# Patient Record
Sex: FEMALE | Race: WHITE | NOT HISPANIC OR LATINO | Employment: OTHER | URBAN - METROPOLITAN AREA
[De-identification: names, ages, dates, MRNs, and addresses within clinical notes are randomized per-mention and may not be internally consistent; named-entity substitution may affect disease eponyms.]

---

## 2017-01-09 ENCOUNTER — HOSPITAL ENCOUNTER (OUTPATIENT)
Dept: RADIOLOGY | Age: 67
Discharge: HOME | End: 2017-01-09
Payer: MEDICARE

## 2017-01-09 DIAGNOSIS — M79.674 PAIN OF TOE OF RIGHT FOOT: ICD-10-CM

## 2017-01-09 PROCEDURE — 73718 MRI LOWER EXTREMITY W/O DYE: CPT

## 2017-01-10 ENCOUNTER — GENERIC CONVERSION - ENCOUNTER (OUTPATIENT)
Dept: OTHER | Facility: OTHER | Age: 67
End: 2017-01-10

## 2017-04-25 ENCOUNTER — ALLSCRIPTS OFFICE VISIT (OUTPATIENT)
Dept: OTHER | Facility: OTHER | Age: 67
End: 2017-04-25

## 2017-04-25 DIAGNOSIS — I10 ESSENTIAL (PRIMARY) HYPERTENSION: ICD-10-CM

## 2017-04-25 DIAGNOSIS — K31.7 POLYP OF STOMACH AND DUODENUM: ICD-10-CM

## 2017-04-25 DIAGNOSIS — D50.9 IRON DEFICIENCY ANEMIA: ICD-10-CM

## 2017-04-25 LAB — S PYO AG THROAT QL: NEGATIVE

## 2017-04-26 ENCOUNTER — LAB (OUTPATIENT)
Dept: LAB | Facility: CLINIC | Age: 67
End: 2017-04-26
Payer: MEDICARE

## 2017-04-26 DIAGNOSIS — D50.9 IRON DEFICIENCY ANEMIA: ICD-10-CM

## 2017-04-26 DIAGNOSIS — I10 ESSENTIAL (PRIMARY) HYPERTENSION: ICD-10-CM

## 2017-04-26 DIAGNOSIS — K31.7 POLYP OF STOMACH AND DUODENUM: ICD-10-CM

## 2017-04-26 LAB
ALBUMIN SERPL BCP-MCNC: 3.7 G/DL (ref 3.5–5)
ALP SERPL-CCNC: 75 U/L (ref 46–116)
ALT SERPL W P-5'-P-CCNC: 26 U/L (ref 12–78)
ANION GAP SERPL CALCULATED.3IONS-SCNC: 5 MMOL/L (ref 4–13)
AST SERPL W P-5'-P-CCNC: 14 U/L (ref 5–45)
BASOPHILS # BLD AUTO: 0.04 THOUSANDS/ΜL (ref 0–0.1)
BASOPHILS NFR BLD AUTO: 0 % (ref 0–1)
BILIRUB SERPL-MCNC: 0.7 MG/DL (ref 0.2–1)
BUN SERPL-MCNC: 16 MG/DL (ref 5–25)
CALCIUM SERPL-MCNC: 8.7 MG/DL (ref 8.3–10.1)
CHLORIDE SERPL-SCNC: 107 MMOL/L (ref 100–108)
CO2 SERPL-SCNC: 28 MMOL/L (ref 21–32)
CREAT SERPL-MCNC: 0.7 MG/DL (ref 0.6–1.3)
EOSINOPHIL # BLD AUTO: 0.19 THOUSAND/ΜL (ref 0–0.61)
EOSINOPHIL NFR BLD AUTO: 2 % (ref 0–6)
ERYTHROCYTE [DISTWIDTH] IN BLOOD BY AUTOMATED COUNT: 16 % (ref 11.6–15.1)
GFR SERPL CREATININE-BSD FRML MDRD: >60 ML/MIN/1.73SQ M
GLUCOSE P FAST SERPL-MCNC: 104 MG/DL (ref 65–99)
HCT VFR BLD AUTO: 39.1 % (ref 34.8–46.1)
HGB BLD-MCNC: 11.9 G/DL (ref 11.5–15.4)
IRON SATN MFR SERPL: 6 %
IRON SERPL-MCNC: 28 UG/DL (ref 50–170)
LYMPHOCYTES # BLD AUTO: 2.13 THOUSANDS/ΜL (ref 0.6–4.47)
LYMPHOCYTES NFR BLD AUTO: 24 % (ref 14–44)
MCH RBC QN AUTO: 24.4 PG (ref 26.8–34.3)
MCHC RBC AUTO-ENTMCNC: 30.4 G/DL (ref 31.4–37.4)
MCV RBC AUTO: 80 FL (ref 82–98)
MONOCYTES # BLD AUTO: 0.8 THOUSAND/ΜL (ref 0.17–1.22)
MONOCYTES NFR BLD AUTO: 9 % (ref 4–12)
NEUTROPHILS # BLD AUTO: 5.76 THOUSANDS/ΜL (ref 1.85–7.62)
NEUTS SEG NFR BLD AUTO: 65 % (ref 43–75)
NRBC BLD AUTO-RTO: 0 /100 WBCS
PLATELET # BLD AUTO: 259 THOUSANDS/UL (ref 149–390)
PMV BLD AUTO: 11 FL (ref 8.9–12.7)
POTASSIUM SERPL-SCNC: 4.3 MMOL/L (ref 3.5–5.3)
PROT SERPL-MCNC: 7.3 G/DL (ref 6.4–8.2)
RBC # BLD AUTO: 4.87 MILLION/UL (ref 3.81–5.12)
SODIUM SERPL-SCNC: 140 MMOL/L (ref 136–145)
TIBC SERPL-MCNC: 463 UG/DL (ref 250–450)
TSH SERPL DL<=0.05 MIU/L-ACNC: 1.89 UIU/ML (ref 0.36–3.74)
WBC # BLD AUTO: 8.94 THOUSAND/UL (ref 4.31–10.16)

## 2017-04-26 PROCEDURE — 83550 IRON BINDING TEST: CPT

## 2017-04-26 PROCEDURE — 85025 COMPLETE CBC W/AUTO DIFF WBC: CPT

## 2017-04-26 PROCEDURE — 36415 COLL VENOUS BLD VENIPUNCTURE: CPT

## 2017-04-26 PROCEDURE — 83540 ASSAY OF IRON: CPT

## 2017-04-26 PROCEDURE — 84443 ASSAY THYROID STIM HORMONE: CPT

## 2017-04-26 PROCEDURE — 80053 COMPREHEN METABOLIC PANEL: CPT

## 2017-04-28 ENCOUNTER — GENERIC CONVERSION - ENCOUNTER (OUTPATIENT)
Dept: OTHER | Facility: OTHER | Age: 67
End: 2017-04-28

## 2017-05-03 ENCOUNTER — GENERIC CONVERSION - ENCOUNTER (OUTPATIENT)
Dept: OTHER | Facility: OTHER | Age: 67
End: 2017-05-03

## 2017-08-22 DIAGNOSIS — I71.4 ABDOMINAL AORTIC ANEURYSM WITHOUT RUPTURE (HCC): ICD-10-CM

## 2017-10-20 ENCOUNTER — HOSPITAL ENCOUNTER (EMERGENCY)
Facility: HOSPITAL | Age: 67
Discharge: HOME | End: 2017-10-20
Attending: EMERGENCY MEDICINE
Payer: MEDICARE

## 2017-10-20 ENCOUNTER — APPOINTMENT (EMERGENCY)
Dept: ULTRASOUND IMAGING | Facility: HOSPITAL | Age: 67
End: 2017-10-20
Payer: MEDICARE

## 2017-10-20 VITALS
BODY MASS INDEX: 34.57 KG/M2 | WEIGHT: 189 LBS | OXYGEN SATURATION: 98 % | TEMPERATURE: 98.5 F | DIASTOLIC BLOOD PRESSURE: 70 MMHG | RESPIRATION RATE: 20 BRPM | HEART RATE: 60 BPM | SYSTOLIC BLOOD PRESSURE: 154 MMHG

## 2017-10-20 DIAGNOSIS — M79.606 LEG PAIN: Primary | ICD-10-CM

## 2017-10-20 PROCEDURE — 99284 EMERGENCY DEPT VISIT MOD MDM: CPT

## 2017-10-20 PROCEDURE — 93971 EXTREMITY STUDY: CPT

## 2017-10-20 RX ORDER — NAPROXEN 500 MG/1
500 TABLET ORAL 2 TIMES DAILY WITH MEALS
Qty: 30 TABLET | Refills: 0 | Status: SHIPPED | OUTPATIENT
Start: 2017-10-20 | End: 2018-06-24

## 2017-10-21 NOTE — DISCHARGE INSTRUCTIONS
Leg Pain   WHAT YOU NEED TO KNOW:   Leg pain may be caused by a variety of health conditions  Your tests did not show any broken bones or blood clots  DISCHARGE INSTRUCTIONS:   Return to the emergency department if:   · You have a fever  · Your leg starts to swell  · Your leg pain gets worse  · You have numbness or tingling in your leg or toes  · You cannot put any weight on or move your leg  Contact your healthcare provider if:   · Your pain does not decrease, even after treatment  · You have questions or concerns about your condition or care  Medicines:   · NSAIDs , such as ibuprofen, help decrease swelling, pain, and fever  This medicine is available with or without a doctor's order  NSAIDs can cause stomach bleeding or kidney problems in certain people  If you take blood thinner medicine, always ask your healthcare provider if NSAIDs are safe for you  Always read the medicine label and follow directions  · Take your medicine as directed  Contact your healthcare provider if you think your medicine is not helping or if you have side effects  Tell him of her if you are allergic to any medicine  Keep a list of the medicines, vitamins, and herbs you take  Include the amounts, and when and why you take them  Bring the list or the pill bottles to follow-up visits  Carry your medicine list with you in case of an emergency  Follow up with your healthcare provider as directed: You may need more tests to find the cause of your leg pain  You may need to see an orthopedic specialist or a physical therapist  Write down your questions so you remember to ask them during your visits  Manage your leg pain:   · Rest  your injured leg so that it can heal  You may need an immobilizer, brace, or splint to limit the movement of your leg  You may need to avoid putting any weight on your leg for at least 48 hours  Return to normal activities as directed      · Ice  the injury for 20 minutes every 4 hours for up to 24 hours, or as directed  Use an ice pack, or put crushed ice in a plastic bag  Cover it with a towel to protect your skin  Ice helps prevent tissue damage and decreases swelling and pain  · Elevate  your injured leg above the level of your heart as often as you can  This will help decrease swelling and pain  If possible, prop your leg on pillows or blankets to keep the area elevated comfortably  · Use assistive devices as directed  You may need to use a cane or crutches  Assistive devices help decrease pain and pressure on your leg when you walk  Ask your healthcare provider for more information about assistive devices and how to use them correctly  · Maintain a healthy weight  Extra body weight can cause pressure and pain in your hip, knee, and ankle joints  Ask your healthcare provider how much you should weigh  Ask him to help you create a weight loss plan if you are overweight  © 2017 [address] [employer] Information is for End User's use only and may not be sold, redistributed or otherwise used for commercial purposes  All illustrations and images included in CareNotes® are the copyrighted property of [employer] , [employer]  or Alex Hansen  The above information is an  only  It is not intended as medical advice for individual conditions or treatments  Talk to your doctor, nurse or pharmacist before following any medical regimen to see if it is safe and effective for you

## 2017-10-21 NOTE — ED PROVIDER NOTES
History  Chief Complaint   Patient presents with    Leg Pain     c/o left leg pain (behind knee) x 1 week      61-year-old female comes in complaining of pain behind her or left knee  Patient has a history of DVT in the past and is concerned she may have a DVT again  History provided by:  Patient   used: No    Leg Pain   Location:  Leg  Time since incident:  1 week  Injury: no    Leg location:  L lower leg  Pain details:     Quality:  Aching, cramping and pressure    Radiates to:  Does not radiate    Severity:  Moderate    Onset quality:  Sudden    Duration:  1 week    Timing:  Constant    Progression:  Worsening  Chronicity:  New  Foreign body present:  No foreign bodies  Tetanus status:  Unknown  Prior injury to area:  No  Relieved by:  Rest  Associated symptoms: swelling    Associated symptoms: no back pain, no fatigue and no fever    Swelling:     Location:  Leg    Onset quality:  Sudden    Duration:  1 week    Timing:  Constant    Progression:  Unchanged    Chronicity:  New  Risk factors: no concern for non-accidental trauma and no recent illness        None       History reviewed  No pertinent past medical history  Past Surgical History:   Procedure Laterality Date    APPENDECTOMY      CARPAL TUNNEL RELEASE      COLONOSCOPY W/ BIOPSIES AND POLYPECTOMY      MASTECTOMY      TONSILLECTOMY         History reviewed  No pertinent family history  I have reviewed and agree with the history as documented  Social History   Substance Use Topics    Smoking status: Never Smoker    Smokeless tobacco: Never Used    Alcohol use No        Review of Systems   Constitutional: Negative for fatigue and fever  HENT: Negative for congestion and ear pain  Eyes: Negative for discharge and redness  Respiratory: Negative for apnea, cough, shortness of breath and wheezing  Cardiovascular: Negative for chest pain  Gastrointestinal: Negative for abdominal pain and diarrhea  Endocrine: Negative for cold intolerance and polydipsia  Genitourinary: Negative for difficulty urinating and hematuria  Musculoskeletal: Negative for arthralgias and back pain  Skin: Negative for color change and rash  Allergic/Immunologic: Negative for environmental allergies and immunocompromised state  Neurological: Negative for numbness and headaches  Hematological: Negative for adenopathy  Does not bruise/bleed easily  Psychiatric/Behavioral: Negative for agitation and behavioral problems  Physical Exam  ED Triage Vitals [10/20/17 2037]   Temperature Pulse Respirations Blood Pressure SpO2   98 5 °F (36 9 °C) 66 16 (!) 194/88 99 %      Temp Source Heart Rate Source Patient Position - Orthostatic VS BP Location FiO2 (%)   Oral Monitor Sitting Right arm --      Pain Score       6           Physical Exam   Constitutional: She is oriented to person, place, and time  Vital signs are normal  She appears well-developed and well-nourished  Non-toxic appearance  HENT:   Head: Normocephalic and atraumatic  Right Ear: Tympanic membrane and external ear normal    Left Ear: Tympanic membrane and external ear normal    Nose: Nose normal  No rhinorrhea, sinus tenderness or nasal deformity  Mouth/Throat: Uvula is midline and oropharynx is clear and moist  Normal dentition  Eyes: Conjunctivae, EOM and lids are normal  Pupils are equal, round, and reactive to light  Right eye exhibits no discharge  Left eye exhibits no discharge  Neck: Trachea normal and normal range of motion  Neck supple  No JVD present  Carotid bruit is not present  Cardiovascular: Normal rate, regular rhythm, intact distal pulses and normal pulses  No extrasystoles are present  PMI is not displaced  Pulmonary/Chest: Effort normal and breath sounds normal  No accessory muscle usage  No respiratory distress  She has no wheezes  She has no rhonchi  She has no rales  Abdominal: Soft   Normal appearance and bowel sounds are normal  She exhibits no mass  There is no tenderness  There is no rigidity, no rebound and no guarding  Musculoskeletal:        Right shoulder: She exhibits normal range of motion, no bony tenderness, no swelling and no deformity  Cervical back: Normal  She exhibits normal range of motion, no tenderness, no bony tenderness and no deformity  Left lower leg: She exhibits tenderness and swelling  Lymphadenopathy:     She has no cervical adenopathy  She has no axillary adenopathy  Neurological: She is alert and oriented to person, place, and time  She has normal strength and normal reflexes  No cranial nerve deficit or sensory deficit  GCS eye subscore is 4  GCS verbal subscore is 5  GCS motor subscore is 6  Skin: Skin is warm and dry  No rash noted  Psychiatric: She has a normal mood and affect  Her speech is normal and behavior is normal    Nursing note and vitals reviewed  ED Medications  Medications - No data to display    Diagnostic Studies  Labs Reviewed - No data to display    VAS lower limb venous duplex study, unilateral/limited    (Results Pending)       Procedures  Procedures      Phone Contacts  ED Phone Contact    ED Course  ED Course                                MDM  Number of Diagnoses or Management Options  Leg pain: new and requires workup     Amount and/or Complexity of Data Reviewed  Tests in the radiology section of CPT®: ordered and reviewed    Patient Progress  Patient progress: improved    CritCare Time    Disposition  Final diagnoses:   Leg pain     ED Disposition     ED Disposition Condition Comment    Discharge  Verlean Flurry discharge to home/self care      Condition at discharge: Good        Follow-up Information     Follow up With Specialties Details Why Contact Info    Raisa Huerta MD Orthopedic Surgery Schedule an appointment as soon as possible for a visit  [facility]  [address] [facility] [address]  [id number] [place] [address]          Discharge Medication List as of 10/20/2017 10:50 PM      START taking these medications    Details   naproxen (NAPROSYN) 500 mg tablet Take 1 tablet by mouth 2 (two) times a day with meals, Starting Fri 10/20/2017, Print           No discharge procedures on file      ED Provider  Electronically Signed by       Lavern Soulier, DO  10/21/17 4584

## 2017-12-07 ENCOUNTER — ALLSCRIPTS OFFICE VISIT (OUTPATIENT)
Dept: OTHER | Facility: OTHER | Age: 67
End: 2017-12-07

## 2017-12-07 DIAGNOSIS — D50.9 IRON DEFICIENCY ANEMIA: ICD-10-CM

## 2017-12-07 DIAGNOSIS — M21.612 BUNION OF GREAT TOE OF LEFT FOOT: ICD-10-CM

## 2017-12-07 DIAGNOSIS — K31.7 POLYP OF STOMACH AND DUODENUM: ICD-10-CM

## 2017-12-07 DIAGNOSIS — E55.9 VITAMIN D DEFICIENCY: ICD-10-CM

## 2017-12-07 DIAGNOSIS — I10 ESSENTIAL (PRIMARY) HYPERTENSION: ICD-10-CM

## 2017-12-08 NOTE — PROGRESS NOTES
Assessment    1  Bunion, left foot (727 1) (M21 612)   2  Varicose veins without complication (086 4) (Z51 45)   3  Iron deficiency anemia (280 9) (D50 9)   4  Gastric polyp (211 1) (K31 7)   5  Hypertension (401 9) (I10)    Plan  Bunion, left foot    · (1) CBC/PLT/DIFF; Status:Active; Requested for:92Kbn6455;    · (1) COMPREHENSIVE METABOLIC PANEL; Status:Active; Requested for:73Gdt4378;   Depression screen    · *VB - Fall Risk Assessment  (Dx Z13 89 Screen for Neurologic Disorder); Status:Complete;   Done:80Vir1050 01:44PM   · *VB - Urinary Incontinence Screen (Dx Z13 89 Screen for UI); Status:Complete;   Done: [id number]:43UB   · *VB-Depression Screening; Status:Complete;   Done: 61FSA4764 01:44PM  Gastric polyp    · (1) URIC ACID; Status:Active; Requested for:33Vkl4264;   Hypertension    · (1) LIPID PANEL, FASTING; Status:Active; Requested for:88Jxv9877;    · (1) TSH; Status:Active; Requested for:35Ghs8858;   Iron deficiency anemia    · (1) IRON; Status:Active; Requested for:57Eze8585;   Mild vitamin D deficiency    · (1) VITAMIN D 25-HYDROXY; Status:Active; Requested for:73Ecd0638;   Need for influenza vaccination    · Stop: Fluzone High-Dose 0 5 ML Intramuscular Suspension Prefilled Syringe    Discussion/Summary  Discussion Summary:   LEFT BUNION/CORN- FOLLO UP WITH PODIATRY- INGRONW NAIL MEDIAL NAILBED- NO INFECTION/;INFLAMMMATIONVEINS LEFT MEDIAL ANKLE- ADD COMPRESSION TOCKINGBLOOD PRESSUREIF BP IS OVER 879 SYSTOLLIC AND SHE HAS A HEADACHE CALL-  Chief Complaint    1  Foot Problem   2  Headache  Chief Complaint Free Text Note Form: PATIETN HERE FOR FOOT PAIN;WAS GETTING HEAD PAIN DURING THE NIGHT- FELT LIKE BACK OF HER HEAD WAS SWOLLEN;      History of Present Illness  Foot Pain: The patient is being seen for worsening symptoms of foot pain   Symptoms:  foot pain-- and-- localized tenderness, but-- no ankle pain,-- no leg pain,-- no localized swelling,-- no localized redness,-- no foot stiffness-- and-- no limping  The patient is currently experiencing symptoms  Associated symptoms:  PAININ LEFT FOOT AND GREAT TOE, but-- no rash-- and-- no pain in other joints  Current treatment includes activity modification  By report, there is good adherence with treatment  HPI: POST HEAD PAIN- BP HAD BEEN HIGH- NOW BETTERTOE ISSUE; SHE HAS BEEN ON KEFLEX AND HAS CORNS; SHE GETS TOE PAIN AND SHINY SKIN ON HER LEFT FOOT; SHE GETS PAININ BACK OF HER HEAAD- WORSE WHEN HER BP IS UP;    Headache: SOFÍA BLACK presents with complaints of headache  Associated symptoms include different headache features-- and-- scalp tenderness, but-- no nausea,-- no vomiting,-- no photophobia,-- no phonophobia,-- no aura,-- no scotoma,-- no numbness,-- no weakness,-- no fever,-- no chills,-- no vertigo,-- no dysarthria,-- no aphasia,-- no diplopia,-- no vision loss,-- no confusion-- and-- no tinnitus  Review of Systems  Complete-Female:  Constitutional: SOME FATIGUE;, but-- No fever, no chills, feels well, no tiredness, no recent weight gain or weight loss,-- no fever-- and-- no chills  Eyes: No complaints of eye pain, no red eyes, no eyesight problems, no discharge, no dry eyes, no itching of eyes,-- no eyesight problems-- and-- no purulent discharge from the eyes  ENT: no complaints of earache, no loss of hearing, no nose bleeds, no nasal discharge, no sore throat, no hoarseness,-- no earache,-- no nosebleeds,-- no hearing loss-- and-- no nasal discharge  Cardiovascular: No complaints of slow heart rate, no fast heart rate, no chest pain, no palpitations, no leg claudication, no lower extremity edema,-- the heart rate was not slow,-- no chest pain,-- the heart rate was not fast-- and-- no palpitations  Respiratory: No complaints of shortness of breath, no wheezing, no cough, no SOB on exertion, no orthopnea, no PND,-- no shortness of breath,-- no cough,-- no wheezing-- and-- no shortness of breath during exertion    Gastrointestinal: P, but-- No complaints of abdominal pain, no constipation, no nausea or vomiting, no diarrhea, no bloody stools,-- as noted in HPI,-- no abdominal pain-- and-- no constipation  Genitourinary: No complaints of dysuria, no incontinence, no pelvic pain, no dysmenorrhea, no vaginal discharge or bleeding,-- no pelvic pain-- and-- no dysmenorrhea  Musculoskeletal: joint swelling-- and-- [PAIN LEFT GREAT TOE; PAIN LEFT BUNION WITH ULCERATION, but-- No complaints of arthralgias, no myalgias, no joint swelling or stiffness, no limb pain or swelling-- and-- as noted in HPI  Integumentary: No complaints of skin rash or lesions, no itching, no skin wounds, no breast pain or lump  Neurological: No complaints of headache, no confusion, no convulsions, no numbness, no dizziness or fainting, no tingling, no limb weakness, no difficulty walking  Psychiatric: Not suicidal, no sleep disturbance, no anxiety or depression, no change in personality, no emotional problems  Endocrine: No complaints of proptosis, no hot flashes, no muscle weakness, no deepening of the voice, no feelings of weakness  Hematologic/Lymphatic: No complaints of swollen glands, no swollen glands in the neck, does not bleed easily, does not bruise easily  ROS Reviewed:   ROS reviewed  Active Problems  1  AAA (abdominal aortic aneurysm) without rupture (441 4) (I71 4)   2  Acute frontal sinusitis, recurrence not specified (461 1) (J01 10)   3  Atypical angina (413 9) (I20 8)   4  Bunion, left foot (727 1) (M21 612)   5  Depression screen (V79 0) (Z13 89)   6  Ecchymosis (459 89) (R58)   7  Encounter for screening for vascular disease (V81 2) (Z13 6)   8  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)   9  Gastric polyp (211 1) (K31 7)   10  Great toe pain, right (729 5) (M79 674)   11  Helicobacter pylori (H  pylori) infection (041 86) (A04 8)   12  Hypertension (401 9) (I10)   13  Iron deficiency anemia (280 9) (D50 9)   14  Lymphedema (457 1) (I89 0)   15   Mild vitamin D deficiency (268 9) (E55 9)   16  Mitral regurgitation (424 0) (I34 0)   17  Need for influenza vaccination (V04 81) (Z23)   18  Post-viral reactive airway disease (493 10) (J45 998)   19  Primary osteoarthritis of left knee (715 16) (M17 12)   20  Primary osteoarthritis of left knee (715 16) (M17 12)   21  Sore throat (462) (J02 9)   22  Umbilical hernia (829 7) (K42 9)    Past Medical History  1  History of Hip pain, unspecified laterality   2  History of acute sinusitis (V12 69) (Z87 09)   3  History of Skin rash (782 1) (R21)  Active Problems And Past Medical History Reviewed: The active problems and past medical history were reviewed and updated today  Surgical History    1  History of Breast Surgery Mastectomy  Surgical History Reviewed: The surgical history was reviewed and updated today  Family History  Father    1  Family history of Abdominal aortic aneurysm (AAA) without rupture  Brother    2  Family history of Ruptured abdominal aortic aneurysm (AAA)  Family History    3  Family history of Acute venous embolism and thrombosis of deep vessels of distal lower extremity (453 42) (I82 4Z9)   4  Family history of Breast Cancer (V16 3)  Family History Reviewed: The family history was reviewed and updated today  Social History     · Never A Smoker  Social History Reviewed: The social history was reviewed and updated today  The social history was reviewed and is unchanged  Current Meds   1  Advil CAPS; TAKE 1 CAPSULE EVERY 4 TO 6 HOURS Recorded   2  Aleve TABS; Take 1 tablet twice daily as needed Recorded   3  ALPRAZolam 0 25 MG Oral Tablet; TAKE 1 TABLET TWICE DAILY AS NEEDED; Therapy: 74Aww0482 to (Evaluate:05Nec1574); Last Rx:[id number] Ordered   4  Wax, White WAX; PARAFIN WAX TO BE USED AS DIRECTED 1 MONTH SUPPLY; Therapy: 09AXI8028 to (Evaluate:10Vbe1293); Last Rx:51Ego0535 Ordered  Medication List Reviewed: The medication list was reviewed and updated today  Allergies  1  No Known Drug Allergies  Denied    2  Penicillins    Vitals  Vital Signs    Recorded: 27IRZ9668 01:42PM   Temperature 97 8 F   Heart Rate 74   Respiration 16   Systolic 079, LUE, Sitting   Diastolic 72, LUE, Sitting   BP CUFF SIZE Large   Height 5 ft 2 in   Weight 191 lb 3 2 oz   BMI Calculated 34 97   BSA Calculated 1 88       Physical Exam   Constitutional  General appearance: No acute distress, well appearing and well nourished  -- PT AWAKE AND ALERT  Eyes  Conjunctiva and lids: No swelling, erythema or discharge  -- NORMAL ROM OF C SPINE;  Pupils and irises: Equal, round and reactive to light  Ears, Nose, Mouth, and Throat  External inspection of ears and nose: Normal    Otoscopic examination: Tympanic membranes translucent with normal light reflex  Canals patent without erythema  Nasal mucosa, septum, and turbinates: Normal without edema or erythema  Oropharynx: Normal with no erythema, edema, exudate or lesions  Inspection of the oropharynx showed fully visible tonsils, uvula and soft palate (Mallampati class 1)  Oral mucosa was moist, but-- was normal  The palate examination showed no abnormalities  The tongue was normal  The tonsils were normal   Pulmonary  Respiratory effort: No increased work of breathing or signs of respiratory distress  Auscultation of lungs: Clear to auscultation  -- CLEAR B/L  Cardiovascular  Palpation of heart: Normal PMI, no thrills  Auscultation of heart: Normal rate and rhythm, normal S1 and S2, without murmurs  Examination of extremities for edema and/or varicosities: Normal    Abdomen  Abdomen: Non-tender, no masses  Liver and spleen: No hepatomegaly or splenomegaly  Lymphatic  Palpation of lymph nodes in neck: No lymphadenopathy  Musculoskeletal  Gait and station: Normal    Digits and nails: Normal without clubbing or cyanosis     Inspection/palpation of joints, bones, and muscles: Abnormal  -- VARICOSITIES LEFT MEDIAL ANKL;E NO EDEMA NO ULCERATION;   Skin CORN LEFT FIRST AND SECOND TOES- KISSING; INGWONR NAIL LEFT MEDIAL- NO INFECTION;         Results/Data  *VB-Depression Screening 49BKX7043 01:44PM Alicia Selwyn     Test Name Result Flag Reference   Depression Scale Result      Depression Screen - Negative For Symptoms     *VB - Fall Risk Assessment  (Dx Z13 89 Screen for Neurologic Disorder) 54IRQ1540 01:44PM Alicia [place]     Test Name Result Flag Reference   Falls Risk      No falls in the past year     *VB - Urinary Incontinence Screen (Dx Z13 89 Screen for UI) 47HWQ1657 01:44PM Alicia Selwyn     Test Name Result Flag Reference   Urinary Incontinence Assessment [id number]           Signatures   Electronically signed by :  Ashley 92, [address]; Dec  7 2017  2:14PM EST                       (Author)

## 2017-12-16 ENCOUNTER — TRANSCRIBE ORDERS (OUTPATIENT)
Dept: LAB | Facility: CLINIC | Age: 67
End: 2017-12-16

## 2017-12-16 ENCOUNTER — LAB (OUTPATIENT)
Dept: LAB | Facility: CLINIC | Age: 67
End: 2017-12-16
Payer: MEDICARE

## 2017-12-16 DIAGNOSIS — M21.612 BUNION OF GREAT TOE OF LEFT FOOT: ICD-10-CM

## 2017-12-16 DIAGNOSIS — D50.9 IRON DEFICIENCY ANEMIA: ICD-10-CM

## 2017-12-16 DIAGNOSIS — K31.7 POLYP OF STOMACH AND DUODENUM: ICD-10-CM

## 2017-12-16 DIAGNOSIS — I10 ESSENTIAL (PRIMARY) HYPERTENSION: ICD-10-CM

## 2017-12-16 DIAGNOSIS — E55.9 VITAMIN D DEFICIENCY: ICD-10-CM

## 2017-12-16 LAB
25(OH)D3 SERPL-MCNC: 17 NG/ML (ref 30–100)
ALBUMIN SERPL BCP-MCNC: 3.6 G/DL (ref 3.5–5)
ALP SERPL-CCNC: 74 U/L (ref 46–116)
ALT SERPL W P-5'-P-CCNC: 29 U/L (ref 12–78)
ANION GAP SERPL CALCULATED.3IONS-SCNC: 7 MMOL/L (ref 4–13)
AST SERPL W P-5'-P-CCNC: 20 U/L (ref 5–45)
BASOPHILS # BLD AUTO: 0.03 THOUSANDS/ΜL (ref 0–0.1)
BASOPHILS NFR BLD AUTO: 0 % (ref 0–1)
BILIRUB SERPL-MCNC: 0.9 MG/DL (ref 0.2–1)
BUN SERPL-MCNC: 16 MG/DL (ref 5–25)
CALCIUM SERPL-MCNC: 9.1 MG/DL (ref 8.3–10.1)
CHLORIDE SERPL-SCNC: 105 MMOL/L (ref 100–108)
CHOLEST SERPL-MCNC: 176 MG/DL (ref 50–200)
CO2 SERPL-SCNC: 29 MMOL/L (ref 21–32)
CREAT SERPL-MCNC: 0.75 MG/DL (ref 0.6–1.3)
EOSINOPHIL # BLD AUTO: 0.17 THOUSAND/ΜL (ref 0–0.61)
EOSINOPHIL NFR BLD AUTO: 2 % (ref 0–6)
ERYTHROCYTE [DISTWIDTH] IN BLOOD BY AUTOMATED COUNT: 14.6 % (ref 11.6–15.1)
GFR SERPL CREATININE-BSD FRML MDRD: 82 ML/MIN/1.73SQ M
GLUCOSE P FAST SERPL-MCNC: 110 MG/DL (ref 65–99)
HCT VFR BLD AUTO: 42.3 % (ref 34.8–46.1)
HDLC SERPL-MCNC: 63 MG/DL (ref 40–60)
HGB BLD-MCNC: 13.1 G/DL (ref 11.5–15.4)
IRON SERPL-MCNC: 65 UG/DL (ref 50–170)
LDLC SERPL CALC-MCNC: 103 MG/DL (ref 0–100)
LYMPHOCYTES # BLD AUTO: 2.07 THOUSANDS/ΜL (ref 0.6–4.47)
LYMPHOCYTES NFR BLD AUTO: 27 % (ref 14–44)
MCH RBC QN AUTO: 26.4 PG (ref 26.8–34.3)
MCHC RBC AUTO-ENTMCNC: 31 G/DL (ref 31.4–37.4)
MCV RBC AUTO: 85 FL (ref 82–98)
MONOCYTES # BLD AUTO: 0.84 THOUSAND/ΜL (ref 0.17–1.22)
MONOCYTES NFR BLD AUTO: 11 % (ref 4–12)
NEUTROPHILS # BLD AUTO: 4.55 THOUSANDS/ΜL (ref 1.85–7.62)
NEUTS SEG NFR BLD AUTO: 60 % (ref 43–75)
PLATELET # BLD AUTO: 232 THOUSANDS/UL (ref 149–390)
PMV BLD AUTO: 10.5 FL (ref 8.9–12.7)
POTASSIUM SERPL-SCNC: 4.6 MMOL/L (ref 3.5–5.3)
PROT SERPL-MCNC: 7.5 G/DL (ref 6.4–8.2)
RBC # BLD AUTO: 4.96 MILLION/UL (ref 3.81–5.12)
SODIUM SERPL-SCNC: 141 MMOL/L (ref 136–145)
TRIGL SERPL-MCNC: 51 MG/DL
TSH SERPL DL<=0.05 MIU/L-ACNC: 1.82 UIU/ML (ref 0.36–3.74)
URATE SERPL-MCNC: 5.1 MG/DL (ref 2–6.8)
WBC # BLD AUTO: 7.66 THOUSAND/UL (ref 4.31–10.16)

## 2017-12-16 PROCEDURE — 80061 LIPID PANEL: CPT

## 2017-12-16 PROCEDURE — 85025 COMPLETE CBC W/AUTO DIFF WBC: CPT

## 2017-12-16 PROCEDURE — 82306 VITAMIN D 25 HYDROXY: CPT

## 2017-12-16 PROCEDURE — 36415 COLL VENOUS BLD VENIPUNCTURE: CPT

## 2017-12-16 PROCEDURE — 84550 ASSAY OF BLOOD/URIC ACID: CPT

## 2017-12-16 PROCEDURE — 84443 ASSAY THYROID STIM HORMONE: CPT

## 2017-12-16 PROCEDURE — 83540 ASSAY OF IRON: CPT

## 2017-12-16 PROCEDURE — 80053 COMPREHEN METABOLIC PANEL: CPT

## 2017-12-17 ENCOUNTER — GENERIC CONVERSION - ENCOUNTER (OUTPATIENT)
Dept: OTHER | Facility: OTHER | Age: 67
End: 2017-12-17

## 2018-01-09 NOTE — RESULT NOTES
Verified Results  (1) CBC/PLT/DIFF 26Apr2017 08:49AM Suleman Rosas     Test Name Result Flag Reference   WBC COUNT 8 94 Thousand/uL  4 31-10 16   RBC COUNT 4 87 Million/uL  3 81-5 12   HEMOGLOBIN 11 9 g/dL  11 5-15 4   HEMATOCRIT 39 1 %  34 8-46  1   MCV 80 fL L 82-98   MCH 24 4 pg L 26 8-34 3   MCHC 30 4 g/dL L 31 4-37 4   RDW 16 0 % H 11 6-15 1   MPV 11 0 fL  8 9-12 7   PLATELET COUNT 944 Thousands/uL  149-390   nRBC AUTOMATED 0 /100 WBCs     NEUTROPHILS RELATIVE PERCENT 65 %  43-75   LYMPHOCYTES RELATIVE PERCENT 24 %  14-44   MONOCYTES RELATIVE PERCENT 9 %  4-12   EOSINOPHILS RELATIVE PERCENT 2 %  0-6   BASOPHILS RELATIVE PERCENT 0 %  0-1   NEUTROPHILS ABSOLUTE COUNT 5 76 Thousands/? ??L  1 85-7 62   LYMPHOCYTES ABSOLUTE COUNT 2 13 Thousands/? ??L  0 60-4 47   MONOCYTES ABSOLUTE COUNT 0 80 Thousand/? ??L  0 17-1 22   EOSINOPHILS ABSOLUTE COUNT 0 19 Thousand/? ??L  0 00-0 61   BASOPHILS ABSOLUTE COUNT 0 04 Thousands/? ??L  0 00-0 10     (1) COMPREHENSIVE METABOLIC PANEL 57KEU1554 98:58IN Suleman Rosas     Test Name Result Flag Reference   SODIUM 140 mmol/L  136-145   POTASSIUM 4 3 mmol/L  3 5-5 3   CHLORIDE 107 mmol/L  100-108   CARBON DIOXIDE 28 mmol/L  21-32   ANION GAP (CALC) 5 mmol/L  4-13   BLOOD UREA NITROGEN 16 mg/dL  5-25   CREATININE 0 70 mg/dL  0 60-1 30   Standardized to IDMS reference method   CALCIUM 8 7 mg/dL  8 3-10 1   BILI, TOTAL 0 70 mg/dL  0 20-1 00   ALK PHOSPHATAS 75 U/L     ALT (SGPT) 26 U/L  12-78   AST(SGOT) 14 U/L  5-45   ALBUMIN 3 7 g/dL  3 5-5 0   TOTAL PROTEIN 7 3 g/dL  6 4-8 2   eGFR Non-African American      >60 0 ml/min/1 73sq m   [facility] Disease Education Program recommendations are as follows:  GFR calculation is accurate only with a steady state creatinine  Chronic Kidney disease less than 60 ml/min/1 73 sq  meters  Kidney failure less than 15 ml/min/1 73 sq  meters     GLUCOSE FASTING 104 mg/dL H 65-99     (1) TSH [id number] 08:49AM Subhash Carvalho Test Name Result Flag Reference   TSH 1 890 uIU/mL  0 358-3 740   Patients undergoing fluorescein dye angiography may retain small amounts of fluorescein in the body for 48-72 hours post procedure  Samples containing fluorescein can produce falsely depressed TSH values  If the patient had this procedure,a specimen should be resubmitted post fluorescein clearance  The recommended reference ranges for TSH during pregnancy are as follows:  First trimester 0 1 to 2 5 uIU/mL  Second trimester  0 2 to 3 0 uIU/mL  Third trimester 0 3 to 3 0 uIU/m     (1) IRON SATURATION %, TIBC 26Apr2017 08:49AM Amee Sarkar     Test Name Result Flag Reference   IRON SATURATION 6 %     TOTAL IRON BINDING CAPACITY 463 ug/dL H 250-450   IRON 28 ug/dL L    Patients treated with metal-binding drugs (ie  Deferoxamine) may have depressed iron values

## 2018-01-10 NOTE — RESULT NOTES
Verified Results  * MRI FOOT/FOREFOOT TOEST RIGHT WO CONTRAST [id number] 01:55PM Ammy Hope Order Number: [id number]    - Patient Instructions: To schedule this appointment, please contact Central Scheduling at [phone]  Test Name Result Flag Reference   MRI FOOT/FOREFOOT TOEST RIGHT WO CONTRAST (Report)     This is a summary report  The complete report is available in the patient's medical record  If you cannot access the medical record, please contact the sending organization for a detailed fax or copy  MRI RIGHT FOOT     INDICATION: Great toe pain and swelling  Callus  Arthritis  Possible infection  COMPARISON: None  TECHNIQUE: MR sequences were obtained of the right foot including the following: Localizer, sagittal T1/STIR, coronal T1/T2 fat sat, axial T2 fat sat/PD  Images were acquired on a 1 5 Mary unit  Gadolinium was not used     FINDINGS:     SUBCUTANEOUS TISSUES: Normal     BONE MARROW: Midfoot degenerative change noted with advanced changes across the 2nd tarsometatarsal joint  FIRST MTP JOINT: Prominent degenerative change across the 1st metatarsophalangeal joint with marked hallux valgus deformity  Ulnar deviation involving subsequent rays  SESAMOID BONES: Medial sesamoid rotation though otherwise intact  PLANTAR FASCIA: Intact  LISFRANC LIGAMENT: Intact  FOREFOOT TENDONS: Intact  INTERMETATARSAL REGIONS: No bursitis or Zambrano's neuroma  MUSCULATURE: Intact  IMPRESSION:   1  Prominent 1st metatarsophalangeal joint degenerative change with marked hallux valgus deformity  No acute osseous abnormality and no evidence of infection  2  Degenerative change noted throughout the remainder of the visualized foot, especially across the 2nd tarsometatarsal joint         Workstation performed: [id number]     Signed by:   Brent Gardner MD   1/10/17

## 2018-01-11 NOTE — RESULT NOTES
Message   Aorta screen shows mild abnormality in aorta -  we will repeat in 1 year to make sure it is stable  No further treatment right now except follow blood pressure, follow cholesterol  Verified Results  VAS CMS AAA SCREENING [id number] 09:15AM Lauren Grider    Order Number: [id number]    - Patient Instructions: To schedule this appointment, please contact Central Scheduling at [phone]  Test Name Result Flag Reference   VAS CMS AAA SCREENING (Report)     [facility] REPORT   CLINICAL:   Indications: Screening for Unspecified CardioVascular condition [Z13 6]  Family history of abdominal aneuyrsms  Operative History:   Hernia repair   Left Mastectomy   T & A   Clinical   Right Pressure: 130/68 mm Hg  FINDINGS:      Unilateral    Impression PSV (cm/s) EDV (cm/s) Diameter AP    Sup-Kalina Ao              69      0           Jux Renal Aorta                       2 5    Px Inf-Tom Ao                        2 5    Ds Inf-Tom Ao                        1 8    Celiac      >70%        274     57     1 0    Prox  SMA              128     20           Mid  SMA                           0 9       Segment  Right    Left           Diameter AP Diameter AP    Prox ROMY     1 2     1 4             CONCLUSION:   Impression   CMS AAA SCREENING: ABNORMAL:      The infrarenal abdominal aorta is patent but ectatic measuring 2 5 cm in   caliber  Bilateral proximal common iliac arteries are normal in caliber  The celiac trunk and superior mesenteric arteries are ectatic, measuring 1 0   cm  and 0 9 cm , respectively        SIGNATURE:   Electronically Signed by: Cora Rizvi MD, [address]  on 2016-12-23 04:38:25 PM

## 2018-01-13 NOTE — RESULT NOTES
Verified Results  (1) CBC/PLT/DIFF [id number] 07:36AM [facility] Order Number: [id number]  TW Order Number: [id number] Order Number: [id number]     Test Name Result Flag Reference   WBC COUNT 8 24 Thousand/uL  4 31-10 16   RBC COUNT 4 65 Million/uL  3 81-5 12   HEMOGLOBIN 11 1 g/dL L 11 5-15 4   HEMATOCRIT 36 4 %  34 8-46  1   MCV 78 fL L 82-98   MCH 23 9 pg L 26 8-34 3   MCHC 30 5 g/dL L 31 4-37 4   RDW 18 8 % H 11 6-15 1   MPV 11 8 fL  8 9-12 7   PLATELET COUNT 033 Thousands/uL  149-390   nRBC AUTOMATED 0 /100 WBCs     NEUTROPHILS RELATIVE PERCENT 64 %  43-75   LYMPHOCYTES RELATIVE PERCENT 22 %  14-44   MONOCYTES RELATIVE PERCENT 12 %  4-12   EOSINOPHILS RELATIVE PERCENT 2 %  0-6   BASOPHILS RELATIVE PERCENT 0 %  0-1   NEUTROPHILS ABSOLUTE COUNT 5 15 Thousands/?L  1 85-7 62   LYMPHOCYTES ABSOLUTE COUNT 1 83 Thousands/?L  0 60-4 47   MONOCYTES ABSOLUTE COUNT 1 02 Thousand/?L  0 17-1 22   EOSINOPHILS ABSOLUTE COUNT 0 16 Thousand/?L  0 00-0 61   BASOPHILS ABSOLUTE COUNT 0 03 Thousands/?L  0 00-0 10     (1) TSH 24Jun2016 07:36AM [facility] Order Number: [id number]  TW Order Number: [id number] Order Number: [id number] Order Number: [id number] Order Number: [id number]     Test Name Result Flag Reference   TSH 1 970 uIU/mL  0 358-3 740   Patients undergoing fluorescein dye angiography may retain small amounts of fluorescein in the body for 48-72 hours post procedure  Samples containing fluorescein can produce falsely depressed TSH values  If the patient had this procedure,a specimen should be resubmitted post fluorescein clearance            The recommended reference ranges for TSH during pregnancy are as follows:  First trimester 0 1 to 2 5 uIU/mL  Second trimester  0 2 to 3 0 uIU/mL  Third trimester 0 3 to 3 0 uIU/m     (1) COMPREHENSIVE METABOLIC PANEL [id number] 78:39JW Cherylene [facility] [place]   TW Order Number: [id number]  TW Order Number: [id number] Order Number: [id number] Order Number: [id number] Order Number: [id number]     Test Name Result Flag Reference   GLUCOSE,RANDM 112 mg/dL     If the patient is fasting, the ADA then defines impaired fasting glucose as > 100 mg/dL and diabetes as > or equal to 123 mg/dL  SODIUM 139 mmol/L  136-145   POTASSIUM 4 1 mmol/L  3 5-5 3   CHLORIDE 106 mmol/L  100-108   CARBON DIOXIDE 26 mmol/L  21-32   ANION GAP (CALC) 7 mmol/L  4-13   BLOOD UREA NITROGEN 15 mg/dL  5-25   CREATININE 0 62 mg/dL  0 60-1 30   Standardized to IDMS reference method   CALCIUM 8 6 mg/dL  8 3-10 1   BILI, TOTAL 0 78 mg/dL  0 20-1 00   ALK PHOSPHATAS 72 U/L     ALT (SGPT) 28 U/L  12-78   AST(SGOT) 18 U/L  5-45   ALBUMIN 3 6 g/dL  3 5-5 0   TOTAL PROTEIN 7 2 g/dL  6 4-8 2   eGFR Non-African American      >60 0 ml/min/1 73sq [facility] Disease Education Program recommendations are as follows:  GFR calculation is accurate only with a steady state creatinine  Chronic Kidney disease less than 60 ml/min/1 73 sq  meters  Kidney failure less than 15 ml/min/1 73 sq  meters       (1) IRON [id number] 07:36AM [facility]   TW Order Number: [id number]  TW Order Number: [id number] Order Number: [id number] Order Number: [id number] Order Number: [id number]     Test Name Result Flag Reference   IRON 30 ug/dL L      (1) [facility]ERRITIN [id number] 07:36AM [facility]   TW Order Number: [id number]  TW Order Number: [id number] Order Number: [id number] Order Number: [id number] Order Number: [id number]     Test Name Result Flag Reference   FERRITIN 9 ng/mL  8-388     (1) RETICULOCYTE COUNT [id number] 07:36AM [facility] Love   TW Order Number: [id number]  TW Order Number: [id number] Order Number: [id number]     Test Name Result Flag Reference   RETIC ABS # [id number]  [id number]   RETIC % 1 54 %  0 37-1 87

## 2018-01-14 VITALS
DIASTOLIC BLOOD PRESSURE: 86 MMHG | RESPIRATION RATE: 16 BRPM | HEART RATE: 64 BPM | SYSTOLIC BLOOD PRESSURE: 144 MMHG | WEIGHT: 184 LBS | BODY MASS INDEX: 33.86 KG/M2 | HEIGHT: 62 IN | TEMPERATURE: 97.4 F

## 2018-01-14 NOTE — RESULT NOTES
Verified Results  (1) IRON SATURATION %, TIBC [id number] 07:24AM Nicole Cabezas Order Number: [id number]     Test Name Result Flag Reference   IRON SATURATION 3 %     TOTAL IRON BINDING CAPACITY 506 ug/dL H 250-450   IRON 16 ug/dL L

## 2018-01-16 NOTE — RESULT NOTES
Verified Results  * XR FOOT 3+ VIEW RIGHT [id number] 04:54PM Ammy Hope Order Number: [id number]   Performing Comments: ATTN DISTAL RIGHT TOE - RULE OUT OSTEO     Test Name Result Flag Reference   XR FOOT 3+ VW RIGHT (Report)     RIGHT FOOT     INDICATION: Right great toe pain times months  COMPARISON: None     VIEWS: 3; 3 images     FINDINGS:     There is no acute fracture or dislocation  There is marked hallux valgus, with mild degenerative changes at the 1st metatarsophalangeal joint  Moderate degenerative changes seen at the 2nd tarsometatarsal joint  Moderate-sized plantar and retrocalcaneal spurs also noted  No lytic or blastic lesions are seen  Soft tissues are unremarkable  IMPRESSION:       1  No acute osseous abnormality  2  Degenerative changes as described         Workstation performed: [id number]     Signed by:   Lazaro Liriano MD   12/15/16

## 2018-01-16 NOTE — RESULT NOTES
Verified Results  (1) IRON 04Apr2016 08:02AM Nicole Cabezas Order Number: [id number]     Test Name Result Flag Reference   IRON 26 ug/dL L

## 2018-01-18 DIAGNOSIS — N95.0 POSTMENOPAUSAL BLEEDING: ICD-10-CM

## 2018-01-19 ENCOUNTER — TRANSCRIBE ORDERS (OUTPATIENT)
Dept: RADIOLOGY | Facility: HOSPITAL | Age: 68
End: 2018-01-19

## 2018-01-19 ENCOUNTER — HOSPITAL ENCOUNTER (OUTPATIENT)
Dept: RADIOLOGY | Facility: HOSPITAL | Age: 68
Discharge: HOME | End: 2018-01-19
Payer: MEDICARE

## 2018-01-19 DIAGNOSIS — N95.0 POSTMENOPAUSAL BLEEDING: ICD-10-CM

## 2018-01-19 PROCEDURE — 76856 US EXAM PELVIC COMPLETE: CPT

## 2018-01-19 PROCEDURE — 76830 TRANSVAGINAL US NON-OB: CPT

## 2018-01-23 ENCOUNTER — GENERIC CONVERSION - ENCOUNTER (OUTPATIENT)
Dept: OTHER | Facility: OTHER | Age: 68
End: 2018-01-23

## 2018-01-23 VITALS
HEART RATE: 74 BPM | TEMPERATURE: 97.8 F | WEIGHT: 191.2 LBS | HEIGHT: 62 IN | SYSTOLIC BLOOD PRESSURE: 130 MMHG | BODY MASS INDEX: 35.19 KG/M2 | DIASTOLIC BLOOD PRESSURE: 72 MMHG | RESPIRATION RATE: 16 BRPM

## 2018-01-23 NOTE — RESULT NOTES
Discussion/Summary   LABS ARE ALL VERY GOOD- BUT PLEASE ADD VITAMIN D 5,000 IU DAILY AND YOUR SUGAR - WE WILL FOLLOW THAT - REPEAT LABS IN 4-5 MONTHS     Verified Results  (1) CBC/PLT/DIFF [id number] 09:22AM Sandra Porter    Order Number: [id number]     Test Name Result Flag Reference   WBC COUNT 7 66 Thousand/uL  4 31-10 16   RBC COUNT 4 96 Million/uL  3 81-5 12   HEMOGLOBIN 13 1 g/dL  11 5-15 4   HEMATOCRIT 42 3 %  34 8-46  1   MCV 85 fL  82-98   MCH 26 4 pg L 26 8-34 3   MCHC 31 0 g/dL L 31 4-37 4   RDW 14 6 %  11 6-15 1   MPV 10 5 fL  8 9-12 7   PLATELET COUNT 351 Thousands/uL  149-390   NEUTROPHILS RELATIVE PERCENT 60 %  43-75   LYMPHOCYTES RELATIVE PERCENT 27 %  14-44   MONOCYTES RELATIVE PERCENT 11 %  4-12   EOSINOPHILS RELATIVE PERCENT 2 %  0-6   BASOPHILS RELATIVE PERCENT 0 %  0-1   NEUTROPHILS ABSOLUTE COUNT 4 55 Thousands/? ??L  1 85-7 62   LYMPHOCYTES ABSOLUTE COUNT 2 07 Thousands/? ??L  0 60-4 47   MONOCYTES ABSOLUTE COUNT 0 84 Thousand/? ??L  0 17-1 22   EOSINOPHILS ABSOLUTE COUNT 0 17 Thousand/? ??L  0 00-0 61   BASOPHILS ABSOLUTE COUNT 0 03 Thousands/? ??L  0 00-0 10     (1) COMPREHENSIVE METABOLIC PANEL [id number] 66:84RX Sandra Porter    Order Number: [id number]     Test Name Result Flag Reference   SODIUM 141 mmol/L  136-145   POTASSIUM 4 6 mmol/L  3 5-5 3   CHLORIDE 105 mmol/L  100-108   CARBON DIOXIDE 29 mmol/L  21-32   ANION GAP (CALC) 7 mmol/L  4-13   BLOOD UREA NITROGEN 16 mg/dL  5-25   CREATININE 0 75 mg/dL  0 60-1 30   Standardized to IDMS reference method   CALCIUM 9 1 mg/dL  8 3-10 1   BILI, TOTAL 0 90 mg/dL  0 20-1 00   ALK PHOSPHATAS 74 U/L     ALT (SGPT) 29 U/L  12-78   Specimen collection should occur prior to Sulfasalazine administration due to the potential for falsely depressed results  AST(SGOT) 20 U/L  5-45   Specimen collection should occur prior to Sulfasalazine administration due to the potential for falsely depressed results  ALBUMIN 3 6 g/dL  3 5-5 0   TOTAL PROTEIN 7 5 g/dL  6 4-8 2   eGFR 82 ml/min/1 73sq m     National Kidney Disease Education Program recommendations are as follows:  GFR calculation is accurate only with a steady state creatinine  Chronic Kidney disease less than 60 ml/min/1 73 sq  meters  Kidney failure less than 15 ml/min/1 73 sq  meters  GLUCOSE FASTING 110 mg/dL H 65-99   Specimen collection should occur prior to Sulfasalazine administration due to the potential for falsely depressed results  Specimen collection should occur prior to Sulfapyridine administration due to the potential for falsely elevated results  (1) LIPID PANEL, FASTING [id number] 09:22AM [employer] Order Number: [id number]     Test Name Result Flag Reference   CHOLESTEROL 176 mg/dL     HDL,DIRECT 63 mg/dL H 40-60   Specimen collection should occur prior to Metamizole administration due to the potential for falsley depressed results  LDL CHOLESTEROL CALCULATED 103 mg/dL H 0-100   Triglyceride:        Normal <150 mg/dl   Borderline High 150-199 mg/dl   High 200-499 mg/dl   Very High >499 mg/dl      Cholesterol:       Desirable <200 mg/dl    Borderline High 200-239 mg/dl    High >239 mg/dl      HDL Cholesterol:       High>59 mg/dL    Low <41 mg/dL      This screening LDL is a calculated result  It does not have the accuracy of the Direct Measured LDL in the monitoring of patients with hyperlipidemia and/or statin therapy  Direct Measure LDL (JBM070) must be ordered separately in these patients  TRIGLYCERIDES 51 mg/dL  <=150   Specimen collection should occur prior to N-Acetylcysteine or Metamizole administration due to the potential for falsely depressed results       (1) TSH [id number] 09:22AM [employer] Order Number: [id number]     Test Name Result Flag Reference   TSH 1 822 uIU/mL  0 358-3 740   Patients undergoing fluorescein dye angiography may retain small amounts of fluorescein in the body for 48-72 hours post procedure  Samples containing fluorescein can produce falsely depressed TSH values  If the patient had this procedure,a specimen should be resubmitted post fluorescein clearance  The recommended reference ranges for TSH during pregnancy are as follows:  First trimester 0 1 to 2 5 uIU/mL  Second trimester  0 2 to 3 0 uIU/mL  Third trimester 0 3 to 3 0 uIU/m     (1) VITAMIN D 25-HYDROXY [id number] 09:22AM Lotameivette Boothe    Order Number: [id number]     Test Name Result Flag Reference   VIT D 25-HYDROX 17 0 ng/mL L 30 0-100 0   This assay is a certified procedure of the CDC Vitamin D Standardization Certification Program (VDSCP)     Deficiency <20ng/ml   Insufficiency 20-30ng/ml   Sufficient  ng/ml     *Patients undergoing fluorescein dye angiography may retain small amounts of fluorescein in the body for 48-72 hours post procedure  Samples containing fluorescein can produce falsely elevated Vitamin D values  If the patient had this procedure, a specimen should be resubmitted post fluorescein clearance  (1) IRON [id number] 09:22AM [employer] [employer] Order Number: [id number]     Test Name Result Flag Reference   IRON 65 ug/dL     Patients treated with metal-binding drugs (ie  Deferoxamine) may have depressed iron values  (1) URIC ACID [id number] 09:22AM [employer] Order Number: [id number]     Test Name Result Flag Reference   URIC ACID 5 1 mg/dL  2 0-6 8   Specimen collection should occur prior to Metamizole administration due to the potential for falsely depressed results  Plan  Abnormal blood chemistry    · (1) IRON; Status:Active; Requested for:17May2018; Abnormal blood chemistry, Hypertension    · (1) TSH; Status:Active; Requested for:17May2018; Abnormal blood chemistry, Mild vitamin D deficiency    · (1) HEMOGLOBIN A1C; Status:Active;  Requested for:17May2018;    · (1) VITAMIN D 25-HYDROXY; Status:Active; Requested for:17May2018;   Mild vitamin D deficiency    · (1) CBC/PLT/DIFF; Status:Active; Requested for:17May2018;    · (1) COMPREHENSIVE METABOLIC PANEL; Status:Active;  Requested for:17May2018;

## 2018-01-24 NOTE — RESULT NOTES
Verified Results  * [facility] [id number] 02:34PM Nikunj BEAUCHAMP Order Number: [id number]    - Patient Instructions: To schedule this appointment, please contact Central Scheduling at [phone]  Test Name Result Flag Reference   US PELVIS COMPLETE (TRANSABDOMINAL AND TRANSVAGINAL) (Report)     PELVIC ULTRASOUND, COMPLETE     INDICATION: N95 0: Postmenopausal bleeding  History taken directly from the electronic ordering system  Postmenopausal bleeding for one day  Cramping  COMPARISON: None  TECHNIQUE:  Transabdominal pelvic ultrasound was performed in sagittal and transverse planes with a curvilinear transducer  Additional transvaginal imaging was performed to better evaluate the endometrium and ovaries  Imaging included volumetric    sweeps as well as traditional still imaging technique  FINDINGS:     UTERUS:   The uterus is anteverted in position, measuring 7 3 x 2 4 x 3 3 cm  Contour and echotexture appear normal      The cervix shows no suspicious abnormality  ENDOMETRIUM:    Normal caliber of 3 mm  Homogenous and normal in appearance  OVARIES/ADNEXA:   Right ovary: 3 3 x 1 4 x 1 2 cm  No suspicious right ovarian abnormality  Doppler flow within normal limits  Left ovary: Left ovary not discretely identified  No adnexal mass or collection  No suspicious adnexal mass or loculated collections  There is no free fluid  IMPRESSION:      Normal uterus, endometrium and right ovary  Nonvisualization left ovary  No left adnexal mass  Workstation performed: [id number]     Signed by:    Celine Anaya MD   1/22/18

## 2018-03-13 ENCOUNTER — OFFICE VISIT (OUTPATIENT)
Dept: FAMILY MEDICINE CLINIC | Facility: CLINIC | Age: 68
End: 2018-03-13
Payer: MEDICARE

## 2018-03-13 VITALS
HEART RATE: 92 BPM | HEIGHT: 62 IN | TEMPERATURE: 99.5 F | SYSTOLIC BLOOD PRESSURE: 132 MMHG | RESPIRATION RATE: 18 BRPM | DIASTOLIC BLOOD PRESSURE: 82 MMHG

## 2018-03-13 DIAGNOSIS — R73.9 HYPERGLYCEMIA: ICD-10-CM

## 2018-03-13 DIAGNOSIS — I10 ESSENTIAL HYPERTENSION: Primary | ICD-10-CM

## 2018-03-13 DIAGNOSIS — E55.9 VITAMIN D DEFICIENCY: ICD-10-CM

## 2018-03-13 DIAGNOSIS — K29.00 ACUTE GASTRITIS WITHOUT HEMORRHAGE, UNSPECIFIED GASTRITIS TYPE: ICD-10-CM

## 2018-03-13 DIAGNOSIS — I71.4 ABDOMINAL AORTIC ANEURYSM (AAA) WITHOUT RUPTURE (HCC): ICD-10-CM

## 2018-03-13 DIAGNOSIS — D50.0 IRON DEFICIENCY ANEMIA DUE TO CHRONIC BLOOD LOSS: ICD-10-CM

## 2018-03-13 PROCEDURE — 99214 OFFICE O/P EST MOD 30 MIN: CPT | Performed by: INTERNAL MEDICINE

## 2018-03-13 RX ORDER — OMEPRAZOLE 40 MG/1
40 CAPSULE, DELAYED RELEASE ORAL DAILY
Qty: 30 CAPSULE | Refills: 3 | Status: SHIPPED | OUTPATIENT
Start: 2018-03-13 | End: 2018-11-13

## 2018-03-13 RX ORDER — ERGOCALCIFEROL 1.25 MG/1
CAPSULE ORAL
Qty: 12 CAPSULE | Refills: 0 | Status: SHIPPED | OUTPATIENT
Start: 2018-03-13 | End: 2018-06-13 | Stop reason: ALTCHOICE

## 2018-03-13 NOTE — PATIENT INSTRUCTIONS
Check bp once a week - goal 130/80 or less  Labs  Check iron  Add omeprazole 40 mg once a day- 8 weeks then as needed   Add vit d 50,000 weekly for 12 weeks   Check us of aorta

## 2018-03-13 NOTE — PROGRESS NOTES
ASSESSMENT and PLAN:  Vijay Avendano is a 69 y o  female with:   Problem List Items Addressed This Visit     Anemia    Relevant Medications    omeprazole (PriLOSEC) 40 MG capsule    Other Relevant Orders    CBC and differential    Iron    Hypertension - Primary    Relevant Orders    TSH, 3rd generation with T4 reflex    Acute gastritis without hemorrhage    Relevant Medications    omeprazole (PriLOSEC) 40 MG capsule    Other Relevant Orders    CBC and differential    Comprehensive metabolic panel    Vitamin D deficiency    Relevant Medications    ergocalciferol (VITAMIN D2) 50,000 units          SUBJECTIVE:  Vijay Avendano is a 69 y o  female who presents today with a chief complaint of Hypertension and Stool Color Change (Dark)    Patient here for folow up  She is with her daughter; She is better but for last 2 weeks she is tired;  5 weeks ago she had abscess tooth- was on amoxil- she was in pain; She was taking advil - 8 per day; She was taking tylenol  Lack energy  Review of Systems   Constitutional: Positive for fatigue  Negative for activity change, appetite change, chills, diaphoresis, fever and unexpected weight change  HENT: Positive for dental problem  Negative for sore throat, tinnitus and trouble swallowing  Drooling: recent dental issue- was on advil and amoxil     Eyes: Positive for visual disturbance (had blurry vision)  Respiratory: Negative  Cardiovascular: Negative  Gastrointestinal: Negative  Endocrine: Negative  Genitourinary: Negative  Musculoskeletal: Positive for arthralgias (hip and back pain)  Skin: Negative for color change and pallor  Allergic/Immunologic: Negative  Neurological: Positive for light-headedness and headaches  Hematological: Negative  Psychiatric/Behavioral: Negative  I have reviewed the patient's PMH, Social History, Medication List and Allergies        OBJECTIVE:  /82   Pulse 92   Temp 99 5 °F (37 5 °C)   Resp 18   Ht 5' 2" (1 575 m)   Physical Exam   Constitutional: She is oriented to person, place, and time  She appears well-developed and well-nourished  HENT:   Head: Normocephalic and atraumatic  Right Ear: External ear normal    Left Ear: External ear normal    Nose: Nose normal    Mouth/Throat: Oropharynx is clear and moist    Eyes: Conjunctivae and EOM are normal  Pupils are equal, round, and reactive to light  Neck: Normal range of motion  Neck supple  No JVD present  No tracheal deviation present  No thyromegaly present  Cardiovascular: Normal rate, regular rhythm, normal heart sounds and intact distal pulses  Exam reveals no gallop  No murmur heard  Pulmonary/Chest: Effort normal and breath sounds normal  No respiratory distress  She has no wheezes  She has no rales  Abdominal: Soft  Bowel sounds are normal  She exhibits no distension  There is no tenderness  Musculoskeletal: Normal range of motion  She exhibits no edema or tenderness  Neurological: She is alert and oriented to person, place, and time  No cranial nerve deficit  Coordination normal    Skin: Skin is warm and dry  Psychiatric: She has a normal mood and affect  Her behavior is normal  Judgment and thought content normal    Nursing note and vitals reviewed

## 2018-03-14 ENCOUNTER — LAB (OUTPATIENT)
Dept: LAB | Facility: CLINIC | Age: 68
End: 2018-03-14
Payer: MEDICARE

## 2018-03-14 DIAGNOSIS — K29.00 ACUTE GASTRITIS WITHOUT HEMORRHAGE, UNSPECIFIED GASTRITIS TYPE: ICD-10-CM

## 2018-03-14 DIAGNOSIS — E55.9 VITAMIN D DEFICIENCY: ICD-10-CM

## 2018-03-14 DIAGNOSIS — D50.0 IRON DEFICIENCY ANEMIA DUE TO CHRONIC BLOOD LOSS: ICD-10-CM

## 2018-03-14 DIAGNOSIS — R79.9 ABNORMAL FINDING OF BLOOD CHEMISTRY: ICD-10-CM

## 2018-03-14 DIAGNOSIS — R73.9 HYPERGLYCEMIA: ICD-10-CM

## 2018-03-14 DIAGNOSIS — I10 ESSENTIAL HYPERTENSION: ICD-10-CM

## 2018-03-14 LAB
ALBUMIN SERPL BCP-MCNC: 3.3 G/DL (ref 3.5–5)
ALP SERPL-CCNC: 67 U/L (ref 46–116)
ALT SERPL W P-5'-P-CCNC: 18 U/L (ref 12–78)
ANION GAP SERPL CALCULATED.3IONS-SCNC: 7 MMOL/L (ref 4–13)
AST SERPL W P-5'-P-CCNC: 12 U/L (ref 5–45)
BASOPHILS # BLD AUTO: 0.03 THOUSANDS/ΜL (ref 0–0.1)
BASOPHILS NFR BLD AUTO: 0 % (ref 0–1)
BILIRUB SERPL-MCNC: 1.12 MG/DL (ref 0.2–1)
BUN SERPL-MCNC: 14 MG/DL (ref 5–25)
CALCIUM SERPL-MCNC: 8.7 MG/DL (ref 8.3–10.1)
CHLORIDE SERPL-SCNC: 104 MMOL/L (ref 100–108)
CO2 SERPL-SCNC: 27 MMOL/L (ref 21–32)
CREAT SERPL-MCNC: 0.7 MG/DL (ref 0.6–1.3)
EOSINOPHIL # BLD AUTO: 0.13 THOUSAND/ΜL (ref 0–0.61)
EOSINOPHIL NFR BLD AUTO: 1 % (ref 0–6)
ERYTHROCYTE [DISTWIDTH] IN BLOOD BY AUTOMATED COUNT: 15.1 % (ref 11.6–15.1)
EST. AVERAGE GLUCOSE BLD GHB EST-MCNC: 131 MG/DL
GFR SERPL CREATININE-BSD FRML MDRD: 89 ML/MIN/1.73SQ M
GLUCOSE P FAST SERPL-MCNC: 105 MG/DL (ref 65–99)
HBA1C MFR BLD: 6.2 % (ref 4.2–6.3)
HCT VFR BLD AUTO: 38.4 % (ref 34.8–46.1)
HGB BLD-MCNC: 12 G/DL (ref 11.5–15.4)
IRON SERPL-MCNC: 29 UG/DL (ref 50–170)
LYMPHOCYTES # BLD AUTO: 1.96 THOUSANDS/ΜL (ref 0.6–4.47)
LYMPHOCYTES NFR BLD AUTO: 20 % (ref 14–44)
MCH RBC QN AUTO: 25.7 PG (ref 26.8–34.3)
MCHC RBC AUTO-ENTMCNC: 31.3 G/DL (ref 31.4–37.4)
MCV RBC AUTO: 82 FL (ref 82–98)
MONOCYTES # BLD AUTO: 1.15 THOUSAND/ΜL (ref 0.17–1.22)
MONOCYTES NFR BLD AUTO: 12 % (ref 4–12)
NEUTROPHILS # BLD AUTO: 6.6 THOUSANDS/ΜL (ref 1.85–7.62)
NEUTS SEG NFR BLD AUTO: 67 % (ref 43–75)
NRBC BLD AUTO-RTO: 0 /100 WBCS
PLATELET # BLD AUTO: 288 THOUSANDS/UL (ref 149–390)
PMV BLD AUTO: 11 FL (ref 8.9–12.7)
POTASSIUM SERPL-SCNC: 4.1 MMOL/L (ref 3.5–5.3)
PROT SERPL-MCNC: 7.3 G/DL (ref 6.4–8.2)
RBC # BLD AUTO: 4.67 MILLION/UL (ref 3.81–5.12)
SODIUM SERPL-SCNC: 138 MMOL/L (ref 136–145)
TSH SERPL DL<=0.05 MIU/L-ACNC: 1.9 UIU/ML (ref 0.36–3.74)
WBC # BLD AUTO: 9.89 THOUSAND/UL (ref 4.31–10.16)

## 2018-03-14 PROCEDURE — 84443 ASSAY THYROID STIM HORMONE: CPT

## 2018-03-14 PROCEDURE — 83540 ASSAY OF IRON: CPT

## 2018-03-14 PROCEDURE — 80053 COMPREHEN METABOLIC PANEL: CPT

## 2018-03-14 PROCEDURE — 36415 COLL VENOUS BLD VENIPUNCTURE: CPT

## 2018-03-14 PROCEDURE — 85025 COMPLETE CBC W/AUTO DIFF WBC: CPT

## 2018-03-14 PROCEDURE — 83036 HEMOGLOBIN GLYCOSYLATED A1C: CPT

## 2018-03-15 ENCOUNTER — TELEPHONE (OUTPATIENT)
Dept: FAMILY MEDICINE CLINIC | Facility: CLINIC | Age: 68
End: 2018-03-15

## 2018-03-15 DIAGNOSIS — R73.03 PREDIABETES: Primary | ICD-10-CM

## 2018-03-15 DIAGNOSIS — D50.9 IRON DEFICIENCY ANEMIA, UNSPECIFIED IRON DEFICIENCY ANEMIA TYPE: ICD-10-CM

## 2018-03-15 NOTE — TELEPHONE ENCOUNTER
SPOKE WITH Pt, GAVE MESSAGE  CAN YOU ENTER BLOOD WORK YOU WANT HER TO HAVE IN 4 MONTHS AND I WILL MAIL IT TO HER           ----- Message from Ronnie Newton DO sent at 3/15/2018 10:33 AM EDT -----  Please call the patient regarding her abnormal result  her blood sugar is in prediabetic range- I would suggest limiting her carbs and follow up labs in 4 months; her blood count is lower than last time but still in normal range  Her iron is down to 29- so she needs to take the iron daily and the stomach pill and her blood count should come up

## 2018-05-15 ENCOUNTER — OFFICE VISIT (OUTPATIENT)
Dept: FAMILY MEDICINE CLINIC | Facility: CLINIC | Age: 68
End: 2018-05-15
Payer: MEDICARE

## 2018-05-15 VITALS
HEART RATE: 88 BPM | BODY MASS INDEX: 33.31 KG/M2 | RESPIRATION RATE: 16 BRPM | HEIGHT: 62 IN | WEIGHT: 181 LBS | DIASTOLIC BLOOD PRESSURE: 82 MMHG | TEMPERATURE: 97.5 F | SYSTOLIC BLOOD PRESSURE: 136 MMHG

## 2018-05-15 DIAGNOSIS — M20.41 HAMMERTOE OF SECOND TOE OF RIGHT FOOT: ICD-10-CM

## 2018-05-15 DIAGNOSIS — R73.9 HYPERGLYCEMIA: Primary | ICD-10-CM

## 2018-05-15 DIAGNOSIS — D50.0 IRON DEFICIENCY ANEMIA DUE TO CHRONIC BLOOD LOSS: ICD-10-CM

## 2018-05-15 DIAGNOSIS — E55.9 VITAMIN D DEFICIENCY: ICD-10-CM

## 2018-05-15 PROCEDURE — 99214 OFFICE O/P EST MOD 30 MIN: CPT | Performed by: INTERNAL MEDICINE

## 2018-05-15 RX ORDER — ALPRAZOLAM 0.25 MG/1
1 TABLET ORAL 2 TIMES DAILY PRN
COMMUNITY
Start: 2017-04-25 | End: 2018-06-24

## 2018-05-15 RX ORDER — BEESWAX 100 %
1 WAX (GRAM) MISCELLANEOUS AS NEEDED
COMMUNITY
Start: 2015-03-24 | End: 2018-06-24

## 2018-05-15 RX ORDER — AZITHROMYCIN 250 MG/1
TABLET, FILM COATED ORAL
Qty: 6 TABLET | Refills: 0 | Status: SHIPPED | OUTPATIENT
Start: 2018-05-15 | End: 2018-05-19

## 2018-05-15 NOTE — PROGRESS NOTES
ASSESSMENT and PLAN:  Rios Kaplan is a 69 y o  female with:   Problem List Items Addressed This Visit     Anemia    Relevant Medications    azithromycin (ZITHROMAX) 250 mg tablet    Other Relevant Orders    CBC and differential    Iron    Occult Bloood,Fecal Immunochemical    Vitamin D deficiency    Relevant Orders    Vitamin D 25 hydroxy    Hyperglycemia - Primary    Relevant Orders    Comprehensive metabolic panel    HEMOGLOBIN A1C W/ EAG ESTIMATION    Hammertoe of second toe of right foot        Check hemoccults  Black stool may be due to iron suplementation  Check cbc and iron level=-  Pt on ppi  Pt taking vitamin c  Check cmp and hga1c  Check vit d- pt taking 50,000 iu weekly now  SUBJECTIVE:  Rios Kaplan is a 69 y o  female who presents today with a chief complaint of Stool Color Change    Patient here for follow up; she was seen by podiatry and is here for follow up; She was seen by dr Caro Chavez (podiatry) -  Trimmed toenail  She has hammertoe right side second toe and large bunion left great toe; She is taking iron supplementation; her stools are black; She is concerned  She is taking vitamin c  She was treated with vitamin d  Review of Systems   Constitutional: Positive for fatigue  Negative for appetite change, chills, diaphoresis, fever and unexpected weight change  HENT: Negative  Eyes: Negative  Respiratory: Negative  Cardiovascular: Negative  Gastrointestinal: Positive for blood in stool (black stools )  Endocrine: Negative  Genitourinary: Negative  Musculoskeletal: Positive for arthralgias (pain right foot - was seen by podiatry)  Skin: Negative  Allergic/Immunologic: Negative  Neurological: Negative  Hematological: Negative  Psychiatric/Behavioral: Negative  I have reviewed the patient's PMH, Social History, Medication List and Allergies        OBJECTIVE:  /82   Pulse 88   Temp 97 5 °F (36 4 °C)   Resp 16   Ht 5' 1 5" (1 562 m)   Wt 82 1 kg (181 lb)   BMI 33 65 kg/m²   Physical Exam   Constitutional: She is oriented to person, place, and time  She appears well-developed and well-nourished  HENT:   Head: Normocephalic and atraumatic  Right Ear: External ear normal    Left Ear: External ear normal    Nose: Nose normal    Mouth/Throat: Oropharynx is clear and moist    Eyes: Conjunctivae are normal  Pupils are equal, round, and reactive to light  No scleral icterus  Neck: Normal range of motion  Neck supple  No JVD present  No tracheal deviation present  No thyromegaly present  Cardiovascular: Normal rate, regular rhythm, normal heart sounds and intact distal pulses  Pulmonary/Chest: Effort normal and breath sounds normal  No respiratory distress  She has no wheezes  Abdominal: Soft  Bowel sounds are normal  She exhibits no distension  There is no tenderness  There is no rebound and no guarding  Musculoskeletal: Normal range of motion  She exhibits no edema, tenderness or deformity  Neurological: She is alert and oriented to person, place, and time  She has normal reflexes  No cranial nerve deficit  Skin: Skin is warm and dry  No rash noted  She is not diaphoretic  No erythema  Psychiatric: She has a normal mood and affect  Her behavior is normal  Judgment normal    Nursing note and vitals reviewed

## 2018-05-16 ENCOUNTER — LAB (OUTPATIENT)
Dept: LAB | Facility: CLINIC | Age: 68
End: 2018-05-16
Payer: MEDICARE

## 2018-05-16 DIAGNOSIS — D50.0 IRON DEFICIENCY ANEMIA DUE TO CHRONIC BLOOD LOSS: ICD-10-CM

## 2018-05-16 DIAGNOSIS — E55.9 VITAMIN D DEFICIENCY: ICD-10-CM

## 2018-05-16 DIAGNOSIS — R73.9 HYPERGLYCEMIA: ICD-10-CM

## 2018-05-16 LAB
25(OH)D3 SERPL-MCNC: 48.8 NG/ML (ref 30–100)
ALBUMIN SERPL BCP-MCNC: 3.4 G/DL (ref 3.5–5)
ALP SERPL-CCNC: 72 U/L (ref 46–116)
ALT SERPL W P-5'-P-CCNC: 30 U/L (ref 12–78)
ANION GAP SERPL CALCULATED.3IONS-SCNC: 5 MMOL/L (ref 4–13)
AST SERPL W P-5'-P-CCNC: 20 U/L (ref 5–45)
BASOPHILS # BLD AUTO: 0.04 THOUSANDS/ΜL (ref 0–0.1)
BASOPHILS NFR BLD AUTO: 1 % (ref 0–1)
BILIRUB SERPL-MCNC: 0.69 MG/DL (ref 0.2–1)
BUN SERPL-MCNC: 12 MG/DL (ref 5–25)
CALCIUM SERPL-MCNC: 9.1 MG/DL (ref 8.3–10.1)
CHLORIDE SERPL-SCNC: 106 MMOL/L (ref 100–108)
CO2 SERPL-SCNC: 28 MMOL/L (ref 21–32)
CREAT SERPL-MCNC: 0.7 MG/DL (ref 0.6–1.3)
EOSINOPHIL # BLD AUTO: 0.21 THOUSAND/ΜL (ref 0–0.61)
EOSINOPHIL NFR BLD AUTO: 3 % (ref 0–6)
ERYTHROCYTE [DISTWIDTH] IN BLOOD BY AUTOMATED COUNT: 17.5 % (ref 11.6–15.1)
EST. AVERAGE GLUCOSE BLD GHB EST-MCNC: 128 MG/DL
GFR SERPL CREATININE-BSD FRML MDRD: 89 ML/MIN/1.73SQ M
GLUCOSE P FAST SERPL-MCNC: 112 MG/DL (ref 65–99)
HBA1C MFR BLD: 6.1 % (ref 4.2–6.3)
HCT VFR BLD AUTO: 42.2 % (ref 34.8–46.1)
HGB BLD-MCNC: 12.8 G/DL (ref 11.5–15.4)
IMM GRANULOCYTES # BLD AUTO: 0.02 THOUSAND/UL (ref 0–0.2)
IMM GRANULOCYTES NFR BLD AUTO: 0 % (ref 0–2)
IRON SERPL-MCNC: 40 UG/DL (ref 50–170)
LYMPHOCYTES # BLD AUTO: 1.88 THOUSANDS/ΜL (ref 0.6–4.47)
LYMPHOCYTES NFR BLD AUTO: 23 % (ref 14–44)
MCH RBC QN AUTO: 26 PG (ref 26.8–34.3)
MCHC RBC AUTO-ENTMCNC: 30.3 G/DL (ref 31.4–37.4)
MCV RBC AUTO: 86 FL (ref 82–98)
MONOCYTES # BLD AUTO: 0.96 THOUSAND/ΜL (ref 0.17–1.22)
MONOCYTES NFR BLD AUTO: 12 % (ref 4–12)
NEUTROPHILS # BLD AUTO: 5.18 THOUSANDS/ΜL (ref 1.85–7.62)
NEUTS SEG NFR BLD AUTO: 63 % (ref 43–75)
NRBC BLD AUTO-RTO: 0 /100 WBCS
PLATELET # BLD AUTO: 243 THOUSANDS/UL (ref 149–390)
PMV BLD AUTO: 10.7 FL (ref 8.9–12.7)
POTASSIUM SERPL-SCNC: 4.2 MMOL/L (ref 3.5–5.3)
PROT SERPL-MCNC: 7.2 G/DL (ref 6.4–8.2)
RBC # BLD AUTO: 4.93 MILLION/UL (ref 3.81–5.12)
SODIUM SERPL-SCNC: 139 MMOL/L (ref 136–145)
WBC # BLD AUTO: 8.29 THOUSAND/UL (ref 4.31–10.16)

## 2018-05-16 PROCEDURE — 83540 ASSAY OF IRON: CPT

## 2018-05-16 PROCEDURE — 83036 HEMOGLOBIN GLYCOSYLATED A1C: CPT

## 2018-05-16 PROCEDURE — 85025 COMPLETE CBC W/AUTO DIFF WBC: CPT

## 2018-05-16 PROCEDURE — 82306 VITAMIN D 25 HYDROXY: CPT

## 2018-05-16 PROCEDURE — 80053 COMPREHEN METABOLIC PANEL: CPT

## 2018-05-16 PROCEDURE — 36415 COLL VENOUS BLD VENIPUNCTURE: CPT

## 2018-05-17 ENCOUNTER — LAB (OUTPATIENT)
Dept: LAB | Facility: CLINIC | Age: 68
End: 2018-05-17
Payer: MEDICARE

## 2018-05-17 ENCOUNTER — TELEPHONE (OUTPATIENT)
Dept: FAMILY MEDICINE CLINIC | Facility: CLINIC | Age: 68
End: 2018-05-17

## 2018-05-17 DIAGNOSIS — R79.9 ABNORMAL FINDING OF BLOOD CHEMISTRY: ICD-10-CM

## 2018-05-17 DIAGNOSIS — I10 ESSENTIAL (PRIMARY) HYPERTENSION: ICD-10-CM

## 2018-05-17 DIAGNOSIS — E55.9 VITAMIN D DEFICIENCY: ICD-10-CM

## 2018-05-17 DIAGNOSIS — D50.0 IRON DEFICIENCY ANEMIA DUE TO CHRONIC BLOOD LOSS: ICD-10-CM

## 2018-05-17 LAB — HEMOCCULT STL QL IA: POSITIVE

## 2018-05-17 PROCEDURE — G0328 FECAL BLOOD SCRN IMMUNOASSAY: HCPCS

## 2018-05-17 NOTE — TELEPHONE ENCOUNTER
----- Message from Yoselin Chávez DO sent at 5/16/2018  1:05 PM EDT -----  Please call the patient regarding her abnormal result  clal pt- vitamin d is much better   blood count is very good-  Iron is a little low but overal lmuch better

## 2018-06-03 ENCOUNTER — HOSPITAL ENCOUNTER (OUTPATIENT)
Dept: ULTRASOUND IMAGING | Facility: HOSPITAL | Age: 68
Discharge: HOME | End: 2018-06-03
Payer: MEDICARE

## 2018-06-03 DIAGNOSIS — I71.4 ABDOMINAL AORTIC ANEURYSM (AAA) WITHOUT RUPTURE (HCC): ICD-10-CM

## 2018-06-03 PROCEDURE — 76775 US EXAM ABDO BACK WALL LIM: CPT

## 2018-06-13 ENCOUNTER — OFFICE VISIT (OUTPATIENT)
Dept: FAMILY MEDICINE CLINIC | Facility: CLINIC | Age: 68
End: 2018-06-13
Payer: MEDICARE

## 2018-06-13 VITALS
HEIGHT: 62 IN | BODY MASS INDEX: 34.16 KG/M2 | TEMPERATURE: 98.8 F | DIASTOLIC BLOOD PRESSURE: 78 MMHG | HEART RATE: 64 BPM | SYSTOLIC BLOOD PRESSURE: 128 MMHG | WEIGHT: 185.6 LBS

## 2018-06-13 DIAGNOSIS — R73.9 HYPERGLYCEMIA: ICD-10-CM

## 2018-06-13 DIAGNOSIS — I10 ESSENTIAL HYPERTENSION: ICD-10-CM

## 2018-06-13 DIAGNOSIS — D50.0 IRON DEFICIENCY ANEMIA DUE TO CHRONIC BLOOD LOSS: ICD-10-CM

## 2018-06-13 DIAGNOSIS — K29.00 ACUTE GASTRITIS WITHOUT HEMORRHAGE, UNSPECIFIED GASTRITIS TYPE: ICD-10-CM

## 2018-06-13 DIAGNOSIS — Z13.6 SCREENING FOR CARDIOVASCULAR CONDITION: ICD-10-CM

## 2018-06-13 DIAGNOSIS — R73.03 PREDIABETES: Primary | ICD-10-CM

## 2018-06-13 PROBLEM — I71.4 ABDOMINAL AORTIC ANEURYSM (AAA) WITHOUT RUPTURE (HCC): Status: RESOLVED | Noted: 2018-03-13 | Resolved: 2018-06-13

## 2018-06-13 PROCEDURE — 99213 OFFICE O/P EST LOW 20 MIN: CPT | Performed by: INTERNAL MEDICINE

## 2018-06-13 NOTE — PROGRESS NOTES
ASSESSMENT and PLAN:  Laisha Samson is a 69 y o  female with:   Problem List Items Addressed This Visit     Anemia    Relevant Orders    HEMOGLOBIN A1C W/ EAG ESTIMATION    Lipid panel    Iron    CBC and differential    Hypertension     stable         Acute gastritis without hemorrhage     Follow up for gi eval-0 colono and egd  Continue ppi   Continue iron  Follow up         Hyperglycemia     hga1c 6 1  Repeat labs in 4 months  Limit carbs  Follow up            Other Visit Diagnoses     Prediabetes    -  Primary    Relevant Orders    HEMOGLOBIN A1C W/ EAG ESTIMATION    Lipid panel    Comprehensive metabolic panel    Screening for cardiovascular condition        Relevant Orders    Lipid panel          SUBJECTIVE:  Laisha Samson is a 69 y o  female who presents today with a chief complaint of Hypertension (3 month follow up) and Anemia    Patient here for follow up  She gets cramping pain in her toes  She was seen by gi and has an appt next week with gastroenterology; she has had egd in the past due to polyps  She had positive hemoccult and appt for egd and colono for July 12th  Dr Brook Sands  She had gastric polyp as well as colono polyp in 2014;  Repeat was done in 2016- reviewed- for gastric polyps  She is here for follow up/      Review of Systems   Constitutional: Negative  HENT: Negative  Eyes: Negative  Respiratory: Negative  Cardiovascular: Negative  Gastrointestinal: Negative  Endocrine: Negative  Genitourinary: Negative  Musculoskeletal: Positive for arthralgias (foot pain/cramping)  Negative for joint swelling, myalgias and neck pain  Skin: Negative  Allergic/Immunologic: Negative  Neurological: Negative  Hematological: Negative  Psychiatric/Behavioral: Negative  I have reviewed the patient's PMH, Social History, Medication List and Allergies        OBJECTIVE:  /78   Pulse 64   Temp 98 8 °F (37 1 °C)   Ht 5' 2" (1 575 m)   Wt 84 2 kg (185 lb 9 6 oz) Breastfeeding? No   BMI 33 95 kg/m²   Physical Exam   Constitutional: She is oriented to person, place, and time  She appears well-developed and well-nourished  HENT:   Head: Normocephalic and atraumatic  Right Ear: External ear normal    Left Ear: External ear normal    Nose: Nose normal    Mouth/Throat: Oropharynx is clear and moist    Eyes: Conjunctivae and EOM are normal  Pupils are equal, round, and reactive to light  Neck: Normal range of motion  Neck supple  No JVD present  No tracheal deviation present  No thyromegaly present  Cardiovascular: Normal rate, regular rhythm, normal heart sounds and intact distal pulses  Pulmonary/Chest: Effort normal and breath sounds normal  No respiratory distress  She has no wheezes  She has no rales  Abdominal: Soft  Bowel sounds are normal  She exhibits no distension  There is no tenderness  There is no rebound  Musculoskeletal: Normal range of motion  She exhibits no edema or tenderness  Neurological: She is alert and oriented to person, place, and time  She has normal reflexes  Skin: Skin is warm and dry  No rash noted  No erythema  Psychiatric: She has a normal mood and affect  Her behavior is normal  Judgment and thought content normal    Nursing note and vitals reviewed

## 2018-06-24 ENCOUNTER — HOSPITAL ENCOUNTER (EMERGENCY)
Facility: HOSPITAL | Age: 68
Discharge: HOME | End: 2018-06-24
Admitting: EMERGENCY MEDICINE
Payer: MEDICARE

## 2018-06-24 VITALS
RESPIRATION RATE: 16 BRPM | BODY MASS INDEX: 33.49 KG/M2 | DIASTOLIC BLOOD PRESSURE: 82 MMHG | OXYGEN SATURATION: 98 % | WEIGHT: 182 LBS | SYSTOLIC BLOOD PRESSURE: 173 MMHG | TEMPERATURE: 98.5 F | HEART RATE: 74 BPM | HEIGHT: 62 IN

## 2018-06-24 DIAGNOSIS — T23.232A PARTIAL THICKNESS BURN OF MULTIPLE FINGERS OF LEFT HAND EXCLUDING THUMB, INITIAL ENCOUNTER: Primary | ICD-10-CM

## 2018-06-24 DIAGNOSIS — T23.221A PARTIAL THICKNESS BURN OF FINGER OF RIGHT HAND, INITIAL ENCOUNTER: ICD-10-CM

## 2018-06-24 PROCEDURE — 90715 TDAP VACCINE 7 YRS/> IM: CPT | Performed by: PHYSICIAN ASSISTANT

## 2018-06-24 PROCEDURE — 99283 EMERGENCY DEPT VISIT LOW MDM: CPT

## 2018-06-24 PROCEDURE — 90471 IMMUNIZATION ADMIN: CPT

## 2018-06-24 RX ORDER — GINSENG 100 MG
1 CAPSULE ORAL ONCE
Status: COMPLETED | OUTPATIENT
Start: 2018-06-24 | End: 2018-06-24

## 2018-06-24 RX ORDER — OXYCODONE HYDROCHLORIDE AND ACETAMINOPHEN 5; 325 MG/1; MG/1
1 TABLET ORAL EVERY 4 HOURS PRN
Qty: 7 TABLET | Refills: 0 | Status: SHIPPED | OUTPATIENT
Start: 2018-06-24 | End: 2018-07-04

## 2018-06-24 RX ADMIN — TETANUS TOXOID, REDUCED DIPHTHERIA TOXOID AND ACELLULAR PERTUSSIS VACCINE, ADSORBED 0.5 ML: 5; 2.5; 8; 8; 2.5 SUSPENSION INTRAMUSCULAR at 15:07

## 2018-06-24 RX ADMIN — BACITRACIN ZINC 1 LARGE APPLICATION: 500 OINTMENT TOPICAL at 15:07

## 2018-06-24 NOTE — ED PROVIDER NOTES
History  Chief Complaint   Patient presents with    Burn     pt presesnts with burn to left middle, ring and little finger tips after accidentally touching hot engine motor     Pt 70 yo f with significant pmh anemia, gerd, and anxiety here today c/o burn to L fingers just prior to arrival  Pt was rotatilling her garden when she got stuck in a hole  She tried to get the tool out of the hole but accidentally touched the hot motor with her left middle, ring and pinkie fingers  She sustained a burn to the dips of each of those fingers  She has been soaking it in baking powder with relief  She does not remember her last tdap  She did not take any otc meds  Pt sustained second degree partial thickness burn to her L middle, ring and pinkie DIPs  She has ruptured blisters to the middle and ring DIPs  I will update tdap, rx short course percocet, dress the wound and have pt f/u with burn clinic this week  Pt agreeable  To note for coders: I cannot remove the r hand dx since the percocet is associated with it  History provided by:  Patient   used: No    Burn   Burn location:  Finger  Finger burn location:  R long finger, R ring finger and R little finger  Burn quality:  Ruptured blister  Progression:  Unchanged  Mechanism of burn:  Hot surface  Incident location:  Home  Relieved by: baking soda mixture  Worsened by: Tactile pressure and movement  Associated symptoms: no cough and no shortness of breath    Tetanus status:  Out of date      Prior to Admission Medications   Prescriptions Last Dose Informant Patient Reported? Taking? FERROUS FUMARATE ER PO  Self Yes Yes   Sig: Take 1 tablet by mouth daily   omeprazole (PriLOSEC) 40 MG capsule  Self No Yes   Sig: Take 1 capsule (40 mg total) by mouth daily      Facility-Administered Medications: None       History reviewed  No pertinent past medical history      Past Surgical History:   Procedure Laterality Date    APPENDECTOMY      CARPAL TUNNEL RELEASE      COLONOSCOPY W/ BIOPSIES AND POLYPECTOMY      HERNIA REPAIR      MASTECTOMY      TONSILLECTOMY         History reviewed  No pertinent family history  I have reviewed and agree with the history as documented  Social History   Substance Use Topics    Smoking status: Never Smoker    Smokeless tobacco: Never Used    Alcohol use No        Review of Systems   Constitutional: Negative for chills and fever  HENT: Negative for congestion, ear pain and sore throat  Respiratory: Negative for cough, shortness of breath and wheezing  Gastrointestinal: Negative for abdominal pain, diarrhea and nausea  Musculoskeletal: Negative for myalgias  Skin: Negative for color change and rash  Neurological: Negative for light-headedness and headaches  Psychiatric/Behavioral: Negative for behavioral problems  The patient is not hyperactive  All other systems reviewed and are negative  Physical Exam  Physical Exam   Constitutional: She is oriented to person, place, and time  She appears well-developed and well-nourished  No distress  HENT:   Head: Atraumatic  Neck: Neck supple  Pulmonary/Chest: Effort normal  No respiratory distress  Musculoskeletal: She exhibits no edema  Neurological: She is alert and oriented to person, place, and time  Skin: Skin is warm and dry  No rash noted  No erythema  No pallor  Ruptured blisters to the L middle and ring dips with erythema to the pinkie dip  Radial pulse 2+ evelia but painful   Psychiatric: She has a normal mood and affect  Her behavior is normal    Nursing note and vitals reviewed        Vital Signs  ED Triage Vitals [06/24/18 1445]   Temperature Pulse Respirations Blood Pressure SpO2   98 5 °F (36 9 °C) 74 16 (!) 173/82 98 %      Temp Source Heart Rate Source Patient Position - Orthostatic VS BP Location FiO2 (%)   Oral Monitor Sitting Right arm --      Pain Score       7           Vitals:    06/24/18 1445   BP: (!) 173/82 Pulse: 74   Patient Position - Orthostatic VS: Sitting       Visual Acuity      ED Medications  Medications   tetanus-diphtheria-acellular pertussis (BOOSTRIX) IM injection 0 5 mL (0 5 mL Intramuscular Given 6/24/18 1507)   bacitracin topical ointment 1 large application (1 large application Topical Given 6/24/18 1507)       Diagnostic Studies  Results Reviewed     None                 No orders to display              Procedures  Procedures       Phone Contacts  ED Phone Contact    ED Course                               MDM  CritCare Time    Disposition  Final diagnoses:   Partial thickness burn of finger of left hand, initial encounter   Partial thickness burn of multiple fingers of left hand excluding thumb, initial encounter     Time reflects when diagnosis was documented in both MDM as applicable and the Disposition within this note     Time User Action Codes Description Comment    6/24/2018  2:58 PM Horris Font Add [E25 364Y] Partial thickness burn of finger of right hand, initial encounter     6/24/2018  3:21 PM Horris Font Modify [R89 791P] Partial thickness burn of finger of left hand, initial encounter     6/24/2018  3:21 PM Yoselin Beltre Add [B84 266R] Partial thickness burn of multiple fingers of left hand excluding thumb, initial encounter     6/24/2018  3:22 PM Horris Font Modify [Q30 908F] Partial thickness burn of finger of left hand, initial encounter     6/24/2018  3:22 PM Yoselin Beltre Modify [T23 232A] Partial thickness burn of multiple fingers of left hand excluding thumb, initial encounter       ED Disposition     ED Disposition Condition Comment    Discharge  Cathy Bello discharge to home/self care      Condition at discharge: Stable        Follow-up Information     Follow up With Specialties Details Why Anshu Shore MD Hand Surgery   [address] [place] [id number] [phone]            Patient's Medications   Discharge Prescriptions    OXYCODONE-ACETAMINOPHEN (PERCOCET) 5-325 MG PER TABLET    Take 1 tablet by mouth every 4 (four) hours as needed for moderate pain for up to 10 days Max Daily Amount: 6 tablets       Start Date: 6/24/2018 End Date: 7/4/2018       Order Dose: 1 tablet       Quantity: 7 tablet    Refills: 0     No discharge procedures on file      ED Provider  Electronically Signed by           Zayda Hwang PA-C  06/24/18 1458 Sancho Jimenez PA-C  06/24/18 3920

## 2018-07-17 ENCOUNTER — TELEPHONE (OUTPATIENT)
Dept: FAMILY MEDICINE CLINIC | Facility: CLINIC | Age: 68
End: 2018-07-17

## 2018-07-18 DIAGNOSIS — E55.9 VITAMIN D DEFICIENCY: Primary | ICD-10-CM

## 2018-07-18 DIAGNOSIS — D50.0 IRON DEFICIENCY ANEMIA DUE TO CHRONIC BLOOD LOSS: Primary | ICD-10-CM

## 2018-07-19 ENCOUNTER — LAB (OUTPATIENT)
Dept: LAB | Facility: CLINIC | Age: 68
End: 2018-07-19
Payer: MEDICARE

## 2018-07-19 DIAGNOSIS — D50.9 IRON DEFICIENCY ANEMIA, UNSPECIFIED IRON DEFICIENCY ANEMIA TYPE: ICD-10-CM

## 2018-07-19 DIAGNOSIS — R73.03 PREDIABETES: ICD-10-CM

## 2018-07-19 DIAGNOSIS — Z13.6 SCREENING FOR CARDIOVASCULAR CONDITION: ICD-10-CM

## 2018-07-19 DIAGNOSIS — D50.0 IRON DEFICIENCY ANEMIA DUE TO CHRONIC BLOOD LOSS: ICD-10-CM

## 2018-07-19 DIAGNOSIS — E55.9 VITAMIN D DEFICIENCY: ICD-10-CM

## 2018-07-19 LAB
25(OH)D3 SERPL-MCNC: 47.1 NG/ML (ref 30–100)
ALBUMIN SERPL BCP-MCNC: 3.7 G/DL (ref 3.5–5)
ALP SERPL-CCNC: 63 U/L (ref 46–116)
ALT SERPL W P-5'-P-CCNC: 27 U/L (ref 12–78)
ANION GAP SERPL CALCULATED.3IONS-SCNC: 6 MMOL/L (ref 4–13)
AST SERPL W P-5'-P-CCNC: 18 U/L (ref 5–45)
BASOPHILS # BLD AUTO: 0.06 THOUSANDS/ΜL (ref 0–0.1)
BASOPHILS NFR BLD AUTO: 1 % (ref 0–1)
BILIRUB SERPL-MCNC: 1.01 MG/DL (ref 0.2–1)
BUN SERPL-MCNC: 15 MG/DL (ref 5–25)
CALCIUM SERPL-MCNC: 9.2 MG/DL (ref 8.3–10.1)
CHLORIDE SERPL-SCNC: 106 MMOL/L (ref 100–108)
CHOLEST SERPL-MCNC: 170 MG/DL (ref 50–200)
CO2 SERPL-SCNC: 28 MMOL/L (ref 21–32)
CREAT SERPL-MCNC: 0.74 MG/DL (ref 0.6–1.3)
EOSINOPHIL # BLD AUTO: 0.12 THOUSAND/ΜL (ref 0–0.61)
EOSINOPHIL NFR BLD AUTO: 1 % (ref 0–6)
ERYTHROCYTE [DISTWIDTH] IN BLOOD BY AUTOMATED COUNT: 15.3 % (ref 11.6–15.1)
EST. AVERAGE GLUCOSE BLD GHB EST-MCNC: 120 MG/DL
GFR SERPL CREATININE-BSD FRML MDRD: 83 ML/MIN/1.73SQ M
GLUCOSE P FAST SERPL-MCNC: 94 MG/DL (ref 65–99)
HBA1C MFR BLD: 5.8 % (ref 4.2–6.3)
HCT VFR BLD AUTO: 40.7 % (ref 34.8–46.1)
HDLC SERPL-MCNC: 50 MG/DL (ref 40–60)
HGB BLD-MCNC: 12.4 G/DL (ref 11.5–15.4)
IMM GRANULOCYTES # BLD AUTO: 0.03 THOUSAND/UL (ref 0–0.2)
IMM GRANULOCYTES NFR BLD AUTO: 0 % (ref 0–2)
IRON SERPL-MCNC: 45 UG/DL (ref 50–170)
LDLC SERPL CALC-MCNC: 108 MG/DL (ref 0–100)
LYMPHOCYTES # BLD AUTO: 1.9 THOUSANDS/ΜL (ref 0.6–4.47)
LYMPHOCYTES NFR BLD AUTO: 23 % (ref 14–44)
MCH RBC QN AUTO: 27 PG (ref 26.8–34.3)
MCHC RBC AUTO-ENTMCNC: 30.5 G/DL (ref 31.4–37.4)
MCV RBC AUTO: 89 FL (ref 82–98)
MONOCYTES # BLD AUTO: 0.76 THOUSAND/ΜL (ref 0.17–1.22)
MONOCYTES NFR BLD AUTO: 9 % (ref 4–12)
NEUTROPHILS # BLD AUTO: 5.54 THOUSANDS/ΜL (ref 1.85–7.62)
NEUTS SEG NFR BLD AUTO: 66 % (ref 43–75)
NONHDLC SERPL-MCNC: 120 MG/DL
NRBC BLD AUTO-RTO: 0 /100 WBCS
PLATELET # BLD AUTO: 237 THOUSANDS/UL (ref 149–390)
PMV BLD AUTO: 11.2 FL (ref 8.9–12.7)
POTASSIUM SERPL-SCNC: 4.5 MMOL/L (ref 3.5–5.3)
PROT SERPL-MCNC: 7.5 G/DL (ref 6.4–8.2)
RBC # BLD AUTO: 4.6 MILLION/UL (ref 3.81–5.12)
SODIUM SERPL-SCNC: 140 MMOL/L (ref 136–145)
TIBC SERPL-MCNC: 389 UG/DL (ref 250–450)
TRIGL SERPL-MCNC: 60 MG/DL
VIT B12 SERPL-MCNC: 265 PG/ML (ref 100–900)
WBC # BLD AUTO: 8.41 THOUSAND/UL (ref 4.31–10.16)

## 2018-07-19 PROCEDURE — 83540 ASSAY OF IRON: CPT

## 2018-07-19 PROCEDURE — 83550 IRON BINDING TEST: CPT

## 2018-07-19 PROCEDURE — 82306 VITAMIN D 25 HYDROXY: CPT

## 2018-07-19 PROCEDURE — 82607 VITAMIN B-12: CPT

## 2018-07-19 PROCEDURE — 36415 COLL VENOUS BLD VENIPUNCTURE: CPT

## 2018-07-19 PROCEDURE — 80053 COMPREHEN METABOLIC PANEL: CPT

## 2018-07-19 PROCEDURE — 85025 COMPLETE CBC W/AUTO DIFF WBC: CPT

## 2018-07-19 PROCEDURE — 80061 LIPID PANEL: CPT

## 2018-07-19 PROCEDURE — 83036 HEMOGLOBIN GLYCOSYLATED A1C: CPT

## 2018-09-05 ENCOUNTER — TELEPHONE (OUTPATIENT)
Dept: FAMILY MEDICINE CLINIC | Facility: CLINIC | Age: 68
End: 2018-09-05

## 2018-09-05 NOTE — TELEPHONE ENCOUNTER
Patient called and is requesting order for blood work  She also would like to check her iron levels and stool sample  She stated the last three days her stool has been very dark  She also said she has  been feeling very tired which is why she would want to check her iron levels  She does have an apt to see you on 09-  Did you want her to come in sooner? Please advise   Patient would like a return call [phone]

## 2018-09-06 ENCOUNTER — LAB (OUTPATIENT)
Dept: LAB | Facility: CLINIC | Age: 68
End: 2018-09-06
Payer: MEDICARE

## 2018-09-06 DIAGNOSIS — D50.9 IRON DEFICIENCY ANEMIA, UNSPECIFIED IRON DEFICIENCY ANEMIA TYPE: ICD-10-CM

## 2018-09-06 DIAGNOSIS — D50.9 IRON DEFICIENCY ANEMIA, UNSPECIFIED IRON DEFICIENCY ANEMIA TYPE: Primary | ICD-10-CM

## 2018-09-06 LAB
ALBUMIN SERPL BCP-MCNC: 3.6 G/DL (ref 3.5–5)
ALP SERPL-CCNC: 65 U/L (ref 46–116)
ALT SERPL W P-5'-P-CCNC: 25 U/L (ref 12–78)
ANION GAP SERPL CALCULATED.3IONS-SCNC: 6 MMOL/L (ref 4–13)
AST SERPL W P-5'-P-CCNC: 21 U/L (ref 5–45)
BASOPHILS # BLD AUTO: 0.06 THOUSANDS/ΜL (ref 0–0.1)
BASOPHILS NFR BLD AUTO: 1 % (ref 0–1)
BILIRUB SERPL-MCNC: 0.93 MG/DL (ref 0.2–1)
BUN SERPL-MCNC: 16 MG/DL (ref 5–25)
CALCIUM SERPL-MCNC: 9.3 MG/DL (ref 8.3–10.1)
CHLORIDE SERPL-SCNC: 101 MMOL/L (ref 100–108)
CO2 SERPL-SCNC: 29 MMOL/L (ref 21–32)
CREAT SERPL-MCNC: 0.76 MG/DL (ref 0.6–1.3)
EOSINOPHIL # BLD AUTO: 0.1 THOUSAND/ΜL (ref 0–0.61)
EOSINOPHIL NFR BLD AUTO: 1 % (ref 0–6)
ERYTHROCYTE [DISTWIDTH] IN BLOOD BY AUTOMATED COUNT: 15.2 % (ref 11.6–15.1)
FERRITIN SERPL-MCNC: 14 NG/ML (ref 8–388)
GFR SERPL CREATININE-BSD FRML MDRD: 80 ML/MIN/1.73SQ M
GLUCOSE P FAST SERPL-MCNC: 81 MG/DL (ref 65–99)
HCT VFR BLD AUTO: 41.9 % (ref 34.8–46.1)
HGB BLD-MCNC: 12.5 G/DL (ref 11.5–15.4)
IMM GRANULOCYTES # BLD AUTO: 0.03 THOUSAND/UL (ref 0–0.2)
IMM GRANULOCYTES NFR BLD AUTO: 0 % (ref 0–2)
IRON SERPL-MCNC: 42 UG/DL (ref 50–170)
LYMPHOCYTES # BLD AUTO: 1.73 THOUSANDS/ΜL (ref 0.6–4.47)
LYMPHOCYTES NFR BLD AUTO: 21 % (ref 14–44)
MCH RBC QN AUTO: 26.7 PG (ref 26.8–34.3)
MCHC RBC AUTO-ENTMCNC: 29.8 G/DL (ref 31.4–37.4)
MCV RBC AUTO: 89 FL (ref 82–98)
MONOCYTES # BLD AUTO: 0.96 THOUSAND/ΜL (ref 0.17–1.22)
MONOCYTES NFR BLD AUTO: 11 % (ref 4–12)
NEUTROPHILS # BLD AUTO: 5.55 THOUSANDS/ΜL (ref 1.85–7.62)
NEUTS SEG NFR BLD AUTO: 66 % (ref 43–75)
NRBC BLD AUTO-RTO: 0 /100 WBCS
PLATELET # BLD AUTO: 252 THOUSANDS/UL (ref 149–390)
PMV BLD AUTO: 11.4 FL (ref 8.9–12.7)
POTASSIUM SERPL-SCNC: 4.3 MMOL/L (ref 3.5–5.3)
PROT SERPL-MCNC: 7.5 G/DL (ref 6.4–8.2)
RBC # BLD AUTO: 4.69 MILLION/UL (ref 3.81–5.12)
RETICS # AUTO: NORMAL 10*3/UL (ref 14097–95744)
RETICS # CALC: 1.2 % (ref 0.37–1.87)
SODIUM SERPL-SCNC: 136 MMOL/L (ref 136–145)
WBC # BLD AUTO: 8.43 THOUSAND/UL (ref 4.31–10.16)

## 2018-09-06 PROCEDURE — 80053 COMPREHEN METABOLIC PANEL: CPT

## 2018-09-06 PROCEDURE — 36415 COLL VENOUS BLD VENIPUNCTURE: CPT

## 2018-09-06 PROCEDURE — 85045 AUTOMATED RETICULOCYTE COUNT: CPT

## 2018-09-06 PROCEDURE — 85025 COMPLETE CBC W/AUTO DIFF WBC: CPT

## 2018-09-06 PROCEDURE — 82728 ASSAY OF FERRITIN: CPT

## 2018-09-06 PROCEDURE — 83540 ASSAY OF IRON: CPT

## 2018-09-07 ENCOUNTER — TRANSCRIBE ORDERS (OUTPATIENT)
Dept: LAB | Facility: CLINIC | Age: 68
End: 2018-09-07

## 2018-09-07 ENCOUNTER — APPOINTMENT (OUTPATIENT)
Dept: LAB | Facility: CLINIC | Age: 68
End: 2018-09-07
Payer: MEDICARE

## 2018-09-07 DIAGNOSIS — D50.9 IRON DEFICIENCY ANEMIA, UNSPECIFIED IRON DEFICIENCY ANEMIA TYPE: ICD-10-CM

## 2018-09-07 LAB — HEMOCCULT STL QL IA: POSITIVE

## 2018-09-07 PROCEDURE — G0328 FECAL BLOOD SCRN IMMUNOASSAY: HCPCS

## 2018-09-20 ENCOUNTER — OFFICE VISIT (OUTPATIENT)
Dept: FAMILY MEDICINE CLINIC | Facility: CLINIC | Age: 68
End: 2018-09-20
Payer: MEDICARE

## 2018-09-20 VITALS
DIASTOLIC BLOOD PRESSURE: 84 MMHG | HEIGHT: 62 IN | WEIGHT: 173 LBS | SYSTOLIC BLOOD PRESSURE: 140 MMHG | RESPIRATION RATE: 16 BRPM | HEART RATE: 80 BPM | BODY MASS INDEX: 31.83 KG/M2

## 2018-09-20 DIAGNOSIS — I10 ESSENTIAL HYPERTENSION: ICD-10-CM

## 2018-09-20 DIAGNOSIS — C15.5 ADENOCARCINOMA OF LOWER ESOPHAGUS (HCC): Primary | ICD-10-CM

## 2018-09-20 PROCEDURE — 99214 OFFICE O/P EST MOD 30 MIN: CPT | Performed by: INTERNAL MEDICINE

## 2018-09-20 RX ORDER — CHOLECALCIFEROL (VITAMIN D3) 25 MCG
5000 CAPSULE ORAL
COMMUNITY

## 2018-09-20 RX ORDER — ASCORBIC ACID 500 MG
1000 TABLET ORAL
COMMUNITY

## 2018-09-20 NOTE — ASSESSMENT & PLAN NOTE
Recent diagnosis of adenocarcinoma of esophagus  Refuses oncology evaluation; she wants to change her diet ; she does not want chemotherapy and she wants to try baking soda/   Will refer to oncology for evaluation   I have encouraged her and her daughter to seek treatment asap and at least follow up with oncology for definitive treatment plan before she decides to not have treatment ; I warned her that she may only live to 71-72 if she does not seek treatment and could end up with a feeding tube if the tumor grows larger; she feels it is improving since she is taking baking soda and sees a change in her bm; I gave her some hemoccults from our office; she is not taking ppi either-  I told her to not have a false sense of security because just because she is feeling well and not having trouble swallowing, she should not think she is improving  I hav etold her and her daughter in no uncertain terms that this could certainly end her life in an unpleasant fashion and I completely understand her lack of support of ocnventional medicine due to her family's experience, she would be remiss if she did not seek an opinion and make an informed decision

## 2018-09-20 NOTE — PROGRESS NOTES
ASSESSMENT and PLAN:  Esmer Holm is a 77 y o  female with:   Problem List Items Addressed This Visit     Hypertension     Follow up          Relevant Orders    CBC and differential    Iron    TSH, 3rd generation    Adenocarcinoma of lower esophagus (HCC) - Primary     Recent diagnosis of adenocarcinoma of esophagus  Refuses oncology evaluation; she wants to change her diet ; she does not want chemotherapy and she wants to try baking soda/   Will refer to oncology for evaluation   I have encouraged her and her daughter to seek treatment asap and at least follow up with oncology for definitive treatment plan before she decides to not have treatment ; I warned her that she may only live to 71-72 if she does not seek treatment and could end up with a feeding tube if the tumor grows larger; she feels it is improving since she is taking baking soda and sees a change in her bm; I gave her some hemoccults from our office; she is not taking ppi either-  I told her to not have a false sense of security because just because she is feeling well and not having trouble swallowing, she should not think she is improving  I hav etold her and her daughter in no uncertain terms that this could certainly end her life in an unpleasant fashion and I completely understand her lack of support of ocnventional medicine due to her family's experience, she would be remiss if she did not seek an opinion and make an informed decision  Relevant Orders    CBC and differential    Iron    TSH, 3rd generation          SUBJECTIVE:  Esmer Holm is a 77 y o  female who presents today with a chief complaint of No chief complaint on file  Patient here to discuss her recent diagnosis of adenocarcinoma of the esophagus  She has read about using baking soda and she had some gi bleedin that resolved with that;  She does not want to use chemotherapy  She has a hx of breast cancer and her daughter  from breast cancer      She is reluctant to follow up with oncology ; She was referred but has not attended an appointment; she [id number] to use otc meds and natural remedies  Review of Systems   Constitutional: Negative  HENT: Negative  Eyes: Negative  Respiratory: Negative  Cardiovascular: Negative  Gastrointestinal: Positive for abdominal distention  Heme positive stool   Endocrine: Negative  Genitourinary: Negative  Musculoskeletal: Negative  Skin: Negative  Allergic/Immunologic: Negative  Neurological: Negative  Hematological: Negative  Psychiatric/Behavioral: Negative  I have reviewed the patient's PMH, Social History, Medication List and Allergies  OBJECTIVE:  /84 (BP Location: Left arm, Patient Position: Sitting, Cuff Size: Standard)   Pulse 80   Resp 16   Ht 5' 2" (1 575 m)   Wt 78 5 kg (173 lb)   BMI 31 64 kg/m²   Physical Exam   Constitutional: She is oriented to person, place, and time  She appears well-developed and well-nourished  HENT:   Head: Normocephalic  Right Ear: External ear normal    Left Ear: External ear normal    Nose: Nose normal    Mouth/Throat: Oropharynx is clear and moist    Eyes: Conjunctivae and EOM are normal  Pupils are equal, round, and reactive to light  No scleral icterus  Neck: Normal range of motion  Neck supple  No JVD present  No tracheal deviation present  No thyromegaly present  Cardiovascular: Normal rate, regular rhythm, normal heart sounds and intact distal pulses  Pulmonary/Chest: Effort normal and breath sounds normal  No respiratory distress  She has no wheezes  She has no rales  Abdominal: Soft  Bowel sounds are normal  She exhibits distension  There is no tenderness  There is no rebound and no guarding  Musculoskeletal: Normal range of motion  She exhibits no edema or tenderness  Neurological: She is alert and oriented to person, place, and time  No cranial nerve deficit  Skin: Skin is warm and dry     Psychiatric: She has a normal mood and affect  Her behavior is normal  Judgment normal    Nursing note and vitals reviewed

## 2018-09-21 ENCOUNTER — TELEPHONE (OUTPATIENT)
Dept: FAMILY MEDICINE CLINIC | Facility: CLINIC | Age: 68
End: 2018-09-21

## 2018-09-21 NOTE — TELEPHONE ENCOUNTER
Patient called asking if T3, T4, and reverse T3 can be added to her TSH order  Please advise  Patient would like a return call   [phone]

## 2018-09-24 DIAGNOSIS — E03.8 HYPOTHYROIDISM DUE TO HASHIMOTO'S THYROIDITIS: Primary | ICD-10-CM

## 2018-09-24 DIAGNOSIS — E06.3 HYPOTHYROIDISM DUE TO HASHIMOTO'S THYROIDITIS: Primary | ICD-10-CM

## 2018-09-26 ENCOUNTER — LAB (OUTPATIENT)
Dept: LAB | Facility: CLINIC | Age: 68
End: 2018-09-26
Payer: MEDICARE

## 2018-09-26 DIAGNOSIS — E03.8 HYPOTHYROIDISM DUE TO HASHIMOTO'S THYROIDITIS: ICD-10-CM

## 2018-09-26 DIAGNOSIS — E06.3 HYPOTHYROIDISM DUE TO HASHIMOTO'S THYROIDITIS: ICD-10-CM

## 2018-09-26 DIAGNOSIS — I10 ESSENTIAL HYPERTENSION: ICD-10-CM

## 2018-09-26 DIAGNOSIS — C15.5 ADENOCARCINOMA OF LOWER ESOPHAGUS (HCC): ICD-10-CM

## 2018-09-26 LAB
BASOPHILS # BLD AUTO: 0.04 THOUSANDS/ΜL (ref 0–0.1)
BASOPHILS NFR BLD AUTO: 1 % (ref 0–1)
EOSINOPHIL # BLD AUTO: 0.12 THOUSAND/ΜL (ref 0–0.61)
EOSINOPHIL NFR BLD AUTO: 2 % (ref 0–6)
ERYTHROCYTE [DISTWIDTH] IN BLOOD BY AUTOMATED COUNT: 15.3 % (ref 11.6–15.1)
HCT VFR BLD AUTO: 41.4 % (ref 34.8–46.1)
HGB BLD-MCNC: 12.6 G/DL (ref 11.5–15.4)
IMM GRANULOCYTES # BLD AUTO: 0.02 THOUSAND/UL (ref 0–0.2)
IMM GRANULOCYTES NFR BLD AUTO: 0 % (ref 0–2)
IRON SERPL-MCNC: 37 UG/DL (ref 50–170)
LYMPHOCYTES # BLD AUTO: 1.54 THOUSANDS/ΜL (ref 0.6–4.47)
LYMPHOCYTES NFR BLD AUTO: 21 % (ref 14–44)
MCH RBC QN AUTO: 26.6 PG (ref 26.8–34.3)
MCHC RBC AUTO-ENTMCNC: 30.4 G/DL (ref 31.4–37.4)
MCV RBC AUTO: 88 FL (ref 82–98)
MONOCYTES # BLD AUTO: 0.78 THOUSAND/ΜL (ref 0.17–1.22)
MONOCYTES NFR BLD AUTO: 11 % (ref 4–12)
NEUTROPHILS # BLD AUTO: 4.88 THOUSANDS/ΜL (ref 1.85–7.62)
NEUTS SEG NFR BLD AUTO: 65 % (ref 43–75)
NRBC BLD AUTO-RTO: 0 /100 WBCS
PLATELET # BLD AUTO: 236 THOUSANDS/UL (ref 149–390)
PMV BLD AUTO: 11.1 FL (ref 8.9–12.7)
RBC # BLD AUTO: 4.73 MILLION/UL (ref 3.81–5.12)
T3FREE SERPL-MCNC: 2.12 PG/ML (ref 2.3–4.2)
T4 FREE SERPL-MCNC: 1.02 NG/DL (ref 0.76–1.46)
TSH SERPL DL<=0.05 MIU/L-ACNC: 1.37 UIU/ML (ref 0.36–3.74)
WBC # BLD AUTO: 7.38 THOUSAND/UL (ref 4.31–10.16)

## 2018-09-26 PROCEDURE — 85025 COMPLETE CBC W/AUTO DIFF WBC: CPT

## 2018-09-26 PROCEDURE — 84439 ASSAY OF FREE THYROXINE: CPT

## 2018-09-26 PROCEDURE — 83540 ASSAY OF IRON: CPT

## 2018-09-26 PROCEDURE — 36415 COLL VENOUS BLD VENIPUNCTURE: CPT

## 2018-09-26 PROCEDURE — 84481 FREE ASSAY (FT-3): CPT

## 2018-09-26 PROCEDURE — 84443 ASSAY THYROID STIM HORMONE: CPT

## 2018-09-27 ENCOUNTER — TELEPHONE (OUTPATIENT)
Dept: FAMILY MEDICINE CLINIC | Facility: CLINIC | Age: 68
End: 2018-09-27

## 2018-09-27 NOTE — TELEPHONE ENCOUNTER
----- Message from Drew Hdz DO sent at 9/27/2018  8:15 AM EDT -----  Please call the patient regarding her abnormal result  call pt her iron is low- probably due to the tumor;   Her thyroid level is normal ; her free t3 is a little low- not really sure how that impacts anything but I urge her to follow up with the oncologist - any one of her choosing

## 2018-10-30 ENCOUNTER — TELEPHONE (OUTPATIENT)
Dept: FAMILY MEDICINE CLINIC | Facility: CLINIC | Age: 68
End: 2018-10-30

## 2018-10-30 DIAGNOSIS — D50.0 IRON DEFICIENCY ANEMIA DUE TO CHRONIC BLOOD LOSS: Primary | ICD-10-CM

## 2018-10-30 DIAGNOSIS — E55.9 VITAMIN D DEFICIENCY: ICD-10-CM

## 2018-10-30 NOTE — TELEPHONE ENCOUNTER
Patient is calling requesting scripts for LABS for IRON,  VIT D & CBC  She said Dr Mike Hobson recommended she repeat bloodwork after one month  Can you order them for her? She r/s her appointment with you in [place]  Please advise      [facility]man patient when in chart     [phone]

## 2018-11-01 ENCOUNTER — APPOINTMENT (OUTPATIENT)
Dept: LAB | Facility: CLINIC | Age: 68
End: 2018-11-01
Payer: MEDICARE

## 2018-11-01 DIAGNOSIS — E55.9 VITAMIN D DEFICIENCY: ICD-10-CM

## 2018-11-01 DIAGNOSIS — D50.0 IRON DEFICIENCY ANEMIA DUE TO CHRONIC BLOOD LOSS: ICD-10-CM

## 2018-11-01 LAB
25(OH)D3 SERPL-MCNC: 41.7 NG/ML (ref 30–100)
BASOPHILS # BLD AUTO: 0.07 THOUSANDS/ΜL (ref 0–0.1)
BASOPHILS NFR BLD AUTO: 1 % (ref 0–1)
EOSINOPHIL # BLD AUTO: 0.13 THOUSAND/ΜL (ref 0–0.61)
EOSINOPHIL NFR BLD AUTO: 2 % (ref 0–6)
ERYTHROCYTE [DISTWIDTH] IN BLOOD BY AUTOMATED COUNT: 15.4 % (ref 11.6–15.1)
FERRITIN SERPL-MCNC: 6 NG/ML (ref 8–388)
HCT VFR BLD AUTO: 40.2 % (ref 34.8–46.1)
HGB BLD-MCNC: 12.6 G/DL (ref 11.5–15.4)
IMM GRANULOCYTES # BLD AUTO: 0.03 THOUSAND/UL (ref 0–0.2)
IMM GRANULOCYTES NFR BLD AUTO: 0 % (ref 0–2)
IRON SATN MFR SERPL: 9 %
IRON SERPL-MCNC: 35 UG/DL (ref 50–170)
LYMPHOCYTES # BLD AUTO: 1.64 THOUSANDS/ΜL (ref 0.6–4.47)
LYMPHOCYTES NFR BLD AUTO: 21 % (ref 14–44)
MCH RBC QN AUTO: 27 PG (ref 26.8–34.3)
MCHC RBC AUTO-ENTMCNC: 31.3 G/DL (ref 31.4–37.4)
MCV RBC AUTO: 86 FL (ref 82–98)
MONOCYTES # BLD AUTO: 0.75 THOUSAND/ΜL (ref 0.17–1.22)
MONOCYTES NFR BLD AUTO: 10 % (ref 4–12)
NEUTROPHILS # BLD AUTO: 5.1 THOUSANDS/ΜL (ref 1.85–7.62)
NEUTS SEG NFR BLD AUTO: 66 % (ref 43–75)
NRBC BLD AUTO-RTO: 0 /100 WBCS
PLATELET # BLD AUTO: 255 THOUSANDS/UL (ref 149–390)
PMV BLD AUTO: 11.5 FL (ref 8.9–12.7)
RBC # BLD AUTO: 4.66 MILLION/UL (ref 3.81–5.12)
TIBC SERPL-MCNC: 387 UG/DL (ref 250–450)
WBC # BLD AUTO: 7.72 THOUSAND/UL (ref 4.31–10.16)

## 2018-11-01 PROCEDURE — 82306 VITAMIN D 25 HYDROXY: CPT

## 2018-11-01 PROCEDURE — 83540 ASSAY OF IRON: CPT

## 2018-11-01 PROCEDURE — 36415 COLL VENOUS BLD VENIPUNCTURE: CPT

## 2018-11-01 PROCEDURE — 82728 ASSAY OF FERRITIN: CPT

## 2018-11-01 PROCEDURE — 85025 COMPLETE CBC W/AUTO DIFF WBC: CPT

## 2018-11-01 PROCEDURE — 83550 IRON BINDING TEST: CPT

## 2018-11-02 ENCOUNTER — TELEPHONE (OUTPATIENT)
Dept: FAMILY MEDICINE CLINIC | Facility: CLINIC | Age: 68
End: 2018-11-02

## 2018-11-02 NOTE — TELEPHONE ENCOUNTER
Patient called asking about any updates on her blood work results  She was asking about her iron  Please advise   Patient would like a return call  [phone]

## 2018-11-02 NOTE — TELEPHONE ENCOUNTER
Her iron is still low, as her PCP recommended, she should f/u with her oncologist ASAP because it could be related to the tumor

## 2018-11-13 ENCOUNTER — OFFICE VISIT (OUTPATIENT)
Dept: FAMILY MEDICINE CLINIC | Facility: CLINIC | Age: 68
End: 2018-11-13
Payer: MEDICARE

## 2018-11-13 VITALS
DIASTOLIC BLOOD PRESSURE: 88 MMHG | RESPIRATION RATE: 16 BRPM | WEIGHT: 164 LBS | HEART RATE: 80 BPM | SYSTOLIC BLOOD PRESSURE: 144 MMHG | TEMPERATURE: 99.2 F | HEIGHT: 62 IN | BODY MASS INDEX: 30.18 KG/M2

## 2018-11-13 DIAGNOSIS — I10 ESSENTIAL HYPERTENSION: ICD-10-CM

## 2018-11-13 DIAGNOSIS — C15.5 ADENOCARCINOMA OF LOWER ESOPHAGUS (HCC): Primary | ICD-10-CM

## 2018-11-13 PROBLEM — K29.00 ACUTE GASTRITIS WITHOUT HEMORRHAGE: Status: RESOLVED | Noted: 2018-03-13 | Resolved: 2018-11-13

## 2018-11-13 PROCEDURE — 99214 OFFICE O/P EST MOD 30 MIN: CPT | Performed by: FAMILY MEDICINE

## 2018-11-13 NOTE — PROGRESS NOTES
FAMILY MEDICINE PROGRESS NOTE  Katrin Anaya 77 y o  female   DATE: November 13, 2018     ASSESSMENT and PLAN:  Katrin Anaya is a 77 y o  female with:     Essential hypertension  BP Readings from Last 3 Encounters:   11/13/18 144/88   09/20/18 140/84   06/24/18 (!) 173/82       Results from last 6 Months  Lab Units 09/06/18  1205 07/19/18  1009   POTASSIUM mmol/L 4 3 4 5   CHLORIDE mmol/L 101 106   CO2 mmol/L 29 28   BUN mg/dL 16 15   CREATININE mg/dL 0 76 0 74   CALCIUM mg/dL 9 3 9 2   ALK PHOS U/L 65 63   ALT U/L 25 27   AST U/L 21 18   BP is borderline elevated, but given lack of comorbidities and age, her goal is <150/90    Iron deficiency anemia due to chronic blood loss  Likely related to esophageal adenocarcinoma, recent Ferritin level was 6  Refer to Hematology    Adenocarcinoma of lower esophagus ([facility] [address] )  I had a long discussion today with the patient and daughter about diagnosis of lower esophageal adenocarcinoma being consistent with her symptoms of abdominal pain and fatigue  She has breast cancer s/p chemo and radiation and a strong family history of cancer related deaths so is wary of intervention  I advised that the alternative treatments she is pursuing are not a cure for her cancer, she needs evaluation by a Hem/Onc to determine her further options  I also discussed Palliative care, but that is not an option for them currently as well  I understand her hesitation, but she is still interested in pursuing further testing, so the best way to facilitate that conversation would be to speak with Hem/Onc  I did place a referral today and if patient decides, I can order a PET scan prior to her appt as well  I have spent 45 minutes with Patient  today in which greater than 50% of this time was spent in counseling/coordination of care regarding Risks and benefits of tx options, Intructions for management, Patient and family education, Importance of treatment compliance and Impressions      SUBJECTIVE:  Ermias Payan Liliana Landa is a 77 y o  female who presents today with a chief complaint of Hypertension (5 month follow up with labs) and Anemia  Pt is here for follow-up of newly diagnosed esophageal adenocarcinoma  Initially was having melanotic stools and diagnosed with esophageal adenocarcinoma but has not seen an Oncologist because she had radiation and chemo with breast cancer and doesn't want to do that again  She was called by Oncology 8-10 times, but felt pressured and didn't follow-up  Denies fevers, chills, dyphagia,  melena, dark stool, BRBPR recently  She does feel like she gets abdominal cramps that come and go, usually q3days at a time  She has a history of strangulated umbilical hernia requiring surgery  She tried baking soda 1 teaspoon in the morning  Last EGD was in July 2018 showed tumor/adenocarcinoma of esophagus    She is pursuing alternative options, is seeing a nutritional specialist, is on magnesium supplements and powdered red beets  She recently started medical marijuana for her symptoms, but not sure if it's helping for the pain  Review of Systems   Constitutional: Positive for unexpected weight change (18 pound weight loss in 5 months)  Negative for chills and fever  Respiratory: Negative for shortness of breath  Gastrointestinal: Positive for abdominal pain  Negative for blood in stool, constipation, diarrhea and nausea  I have reviewed the patient's PMH, Social History, Medication List and Allergies as appropriate  OBJECTIVE:  /88 (BP Location: Left arm, Patient Position: Sitting, Cuff Size: Large)   Pulse 80   Temp 99 2 °F (37 3 °C)   Resp 16   Ht 5' 2" (1 575 m)   Wt 74 4 kg (164 lb)   Breastfeeding? No   BMI 30 00 kg/m²    Physical Exam   Constitutional: She appears well-developed and well-nourished  No distress  HENT:   Head: Normocephalic and atraumatic  Cardiovascular: Normal rate, regular rhythm and normal heart sounds      No murmur heard   Pulmonary/Chest: Effort normal and breath sounds normal  No respiratory distress  She has no wheezes  Abdominal: Soft  Normal appearance and bowel sounds are normal  There is tenderness in the epigastric area  There is no rigidity, no guarding and negative Montes's sign  Neurological: She is alert  Skin: She is not diaphoretic  There is pallor  Vitals reviewed        Appointment on 11/01/2018   Component Date Value Ref Range Status    WBC 11/01/2018 7 72  4 31 - 10 16 Thousand/uL Final    RBC 11/01/2018 4 66  3 81 - 5 12 Million/uL Final    Hemoglobin 11/01/2018 12 6  11 5 - 15 4 g/dL Final    Hematocrit 11/01/2018 40 2  34 8 - 46 1 % Final    MCV 11/01/2018 86  82 - 98 fL Final    MCH 11/01/2018 27 0  26 8 - 34 3 pg Final    MCHC 11/01/2018 31 3* 31 4 - 37 4 g/dL Final    RDW 11/01/2018 15 4* 11 6 - 15 1 % Final    MPV 11/01/2018 11 5  8 9 - 12 7 fL Final    Platelets 85/09/0971 255  149 - 390 Thousands/uL Final    nRBC 11/01/2018 0  /100 WBCs Final    Neutrophils Relative 11/01/2018 66  43 - 75 % Final    Immat GRANS % 11/01/2018 0  0 - 2 % Final    Lymphocytes Relative 11/01/2018 21  14 - 44 % Final    Monocytes Relative 11/01/2018 10  4 - 12 % Final    Eosinophils Relative 11/01/2018 2  0 - 6 % Final    Basophils Relative 11/01/2018 1  0 - 1 % Final    Neutrophils Absolute 11/01/2018 5 10  1 85 - 7 62 Thousands/µL Final    Immature Grans Absolute 11/01/2018 0 03  0 00 - 0 20 Thousand/uL Final    Lymphocytes Absolute 11/01/2018 1 64  0 60 - 4 47 Thousands/µL Final    Monocytes Absolute 11/01/2018 0 75  0 17 - 1 22 Thousand/µL Final    Eosinophils Absolute 11/01/2018 0 13  0 00 - 0 61 Thousand/µL Final    Basophils Absolute 11/01/2018 0 07  0 00 - 0 10 Thousands/µL Final    Vit D, 25-Hydroxy 11/01/2018 41 7  30 0 - 100 0 ng/mL Final    Iron Saturation 11/01/2018 9  % Final    TIBC 11/01/2018 387  250 - 450 ug/dL Final    Iron 11/01/2018 35* 50 - 170 ug/dL Final Patients treated with metal-binding drugs (ie  Deferoxamine) may have depressed iron values   Ferritin 11/01/2018 6* 8 - 388 ng/mL Final   Lab on 09/26/2018   Component Date Value Ref Range Status    WBC 09/26/2018 7 38  4 31 - 10 16 Thousand/uL Final    RBC 09/26/2018 4 73  3 81 - 5 12 Million/uL Final    Hemoglobin 09/26/2018 12 6  11 5 - 15 4 g/dL Final    Hematocrit 09/26/2018 41 4  34 8 - 46 1 % Final    MCV 09/26/2018 88  82 - 98 fL Final    MCH 09/26/2018 26 6* 26 8 - 34 3 pg Final    MCHC 09/26/2018 30 4* 31 4 - 37 4 g/dL Final    RDW 09/26/2018 15 3* 11 6 - 15 1 % Final    MPV 09/26/2018 11 1  8 9 - 12 7 fL Final    Platelets 26/12/0842 236  149 - 390 Thousands/uL Final    nRBC 09/26/2018 0  /100 WBCs Final    Neutrophils Relative 09/26/2018 65  43 - 75 % Final    Immat GRANS % 09/26/2018 0  0 - 2 % Final    Lymphocytes Relative 09/26/2018 21  14 - 44 % Final    Monocytes Relative 09/26/2018 11  4 - 12 % Final    Eosinophils Relative 09/26/2018 2  0 - 6 % Final    Basophils Relative 09/26/2018 1  0 - 1 % Final    Neutrophils Absolute 09/26/2018 4 88  1 85 - 7 62 Thousands/µL Final    Immature Grans Absolute 09/26/2018 0 02  0 00 - 0 20 Thousand/uL Final    Lymphocytes Absolute 09/26/2018 1 54  0 60 - 4 47 Thousands/µL Final    Monocytes Absolute 09/26/2018 0 78  0 17 - 1 22 Thousand/µL Final    Eosinophils Absolute 09/26/2018 0 12  0 00 - 0 61 Thousand/µL Final    Basophils Absolute 09/26/2018 0 04  0 00 - 0 10 Thousands/µL Final    Iron 09/26/2018 37* 50 - 170 ug/dL Final      Patients treated with metal-binding drugs (ie  Deferoxamine) may have depressed iron values   TSH 3RD GENERATON 09/26/2018 1 370  0 358 - 3 740 uIU/mL Final    Using supplements with high doses of biotin 20 to more than 300 times greater than the adequate daily intake for adults of 30 mcg/day as established by the [place] [employer], can cause falsely depress results      T3, Free 09/26/2018 2 12* 2 30 - 4 20 pg/mL Final    Free T4 09/26/2018 1 02  0 76 - 1 46 ng/dL Final      Specimen collection should occur prior to Sulfasalazine administration due to the potential for falsely elevated results  Appointment on 09/07/2018   Component Date Value Ref Range Status    OCCULT BLD, FECAL IMMUNOLOGICAL 09/07/2018 Positive* Negative Final   Lab on 09/06/2018   Component Date Value Ref Range Status    WBC 09/06/2018 8 43  4 31 - 10 16 Thousand/uL Final    RBC 09/06/2018 4 69  3 81 - 5 12 Million/uL Final    Hemoglobin 09/06/2018 12 5  11 5 - 15 4 g/dL Final    Hematocrit 09/06/2018 41 9  34 8 - 46 1 % Final    MCV 09/06/2018 89  82 - 98 fL Final    MCH 09/06/2018 26 7* 26 8 - 34 3 pg Final    MCHC 09/06/2018 29 8* 31 4 - 37 4 g/dL Final    RDW 09/06/2018 15 2* 11 6 - 15 1 % Final    MPV 09/06/2018 11 4  8 9 - 12 7 fL Final    Platelets 97/24/1894 252  149 - 390 Thousands/uL Final    nRBC 09/06/2018 0  /100 WBCs Final    Neutrophils Relative 09/06/2018 66  43 - 75 % Final    Immat GRANS % 09/06/2018 0  0 - 2 % Final    Lymphocytes Relative 09/06/2018 21  14 - 44 % Final    Monocytes Relative 09/06/2018 11  4 - 12 % Final    Eosinophils Relative 09/06/2018 1  0 - 6 % Final    Basophils Relative 09/06/2018 1  0 - 1 % Final    Neutrophils Absolute 09/06/2018 5 55  1 85 - 7 62 Thousands/µL Final    Immature Grans Absolute 09/06/2018 0 03  0 00 - 0 20 Thousand/uL Final    Lymphocytes Absolute 09/06/2018 1 73  0 60 - 4 47 Thousands/µL Final    Monocytes Absolute 09/06/2018 0 96  0 17 - 1 22 Thousand/µL Final    Eosinophils Absolute 09/06/2018 0 10  0 00 - 0 61 Thousand/µL Final    Basophils Absolute 09/06/2018 0 06  0 00 - 0 10 Thousands/µL Final    Iron 09/06/2018 42* 50 - 170 ug/dL Final      Patients treated with metal-binding drugs (ie  Deferoxamine) may have depressed iron values      Ferritin 09/06/2018 14  8 - 388 ng/mL Final    Sodium 09/06/2018 136  136 - 145 mmol/L Final    Potassium 09/06/2018 4 3  3 5 - 5 3 mmol/L Final    Chloride 09/06/2018 101  100 - 108 mmol/L Final    CO2 09/06/2018 29  21 - 32 mmol/L Final    ANION GAP 09/06/2018 6  4 - 13 mmol/L Final    BUN 09/06/2018 16  5 - 25 mg/dL Final    Creatinine 09/06/2018 0 76  0 60 - 1 30 mg/dL Final    Standardized to IDMS reference method    Glucose, Fasting 09/06/2018 81  65 - 99 mg/dL Final      Specimen collection should occur prior to Sulfasalazine administration due to the potential for falsely depressed results  Specimen collection should occur prior to Sulfapyridine administration due to the potential for falsely elevated results   Calcium 09/06/2018 9 3  8 3 - 10 1 mg/dL Final    AST 09/06/2018 21  5 - 45 U/L Final      Specimen collection should occur prior to Sulfasalazine administration due to the potential for falsely depressed results   ALT 09/06/2018 25  12 - 78 U/L Final      Specimen collection should occur prior to Sulfasalazine and/or Sulfapyridine administration due to the potential for falsely depressed results       Alkaline Phosphatase 09/06/2018 65  46 - 116 U/L Final    Total Protein 09/06/2018 7 5  6 4 - 8 2 g/dL Final    Albumin 09/06/2018 3 6  3 5 - 5 0 g/dL Final    Total Bilirubin 09/06/2018 0 93  0 20 - 1 00 mg/dL Final    eGFR 09/06/2018 80  ml/min/1 73sq m Final    Retic Ct Abs 09/06/2018 86932  30,450-25,176 Final    Retic Ct Pct 09/06/2018 1 20  0 37 - 1 87 % Final   Lab on 07/19/2018   Component Date Value Ref Range Status    WBC 07/19/2018 8 41  4 31 - 10 16 Thousand/uL Final    RBC 07/19/2018 4 60  3 81 - 5 12 Million/uL Final    Hemoglobin 07/19/2018 12 4  11 5 - 15 4 g/dL Final    Hematocrit 07/19/2018 40 7  34 8 - 46 1 % Final    MCV 07/19/2018 89  82 - 98 fL Final    MCH 07/19/2018 27 0  26 8 - 34 3 pg Final    MCHC 07/19/2018 30 5* 31 4 - 37 4 g/dL Final    RDW 07/19/2018 15 3* 11 6 - 15 1 % Final    MPV 07/19/2018 11 2  8 9 - 12 7 fL Final    Platelets 41/55/8577 237 149 - 390 Thousands/uL Final    nRBC 07/19/2018 0  /100 WBCs Final    Neutrophils Relative 07/19/2018 66  43 - 75 % Final    Immat GRANS % 07/19/2018 0  0 - 2 % Final    Lymphocytes Relative 07/19/2018 23  14 - 44 % Final    Monocytes Relative 07/19/2018 9  4 - 12 % Final    Eosinophils Relative 07/19/2018 1  0 - 6 % Final    Basophils Relative 07/19/2018 1  0 - 1 % Final    Neutrophils Absolute 07/19/2018 5 54  1 85 - 7 62 Thousands/µL Final    Immature Grans Absolute 07/19/2018 0 03  0 00 - 0 20 Thousand/uL Final    Lymphocytes Absolute 07/19/2018 1 90  0 60 - 4 47 Thousands/µL Final    Monocytes Absolute 07/19/2018 0 76  0 17 - 1 22 Thousand/µL Final    Eosinophils Absolute 07/19/2018 0 12  0 00 - 0 61 Thousand/µL Final    Basophils Absolute 07/19/2018 0 06  0 00 - 0 10 Thousands/µL Final    Sodium 07/19/2018 140  136 - 145 mmol/L Final    Potassium 07/19/2018 4 5  3 5 - 5 3 mmol/L Final    Chloride 07/19/2018 106  100 - 108 mmol/L Final    CO2 07/19/2018 28  21 - 32 mmol/L Final    ANION GAP 07/19/2018 6  4 - 13 mmol/L Final    BUN 07/19/2018 15  5 - 25 mg/dL Final    Creatinine 07/19/2018 0 74  0 60 - 1 30 mg/dL Final    Standardized to IDMS reference method    Glucose, Fasting 07/19/2018 94  65 - 99 mg/dL Final      Specimen collection should occur prior to Sulfasalazine administration due to the potential for falsely depressed results  Specimen collection should occur prior to Sulfapyridine administration due to the potential for falsely elevated results   Calcium 07/19/2018 9 2  8 3 - 10 1 mg/dL Final    AST 07/19/2018 18  5 - 45 U/L Final      Specimen collection should occur prior to Sulfasalazine administration due to the potential for falsely depressed results   ALT 07/19/2018 27  12 - 78 U/L Final      Specimen collection should occur prior to Sulfasalazine and/or Sulfapyridine administration due to the potential for falsely depressed results       Alkaline Phosphatase 07/19/2018 63  46 - 116 U/L Final    Total Protein 07/19/2018 7 5  6 4 - 8 2 g/dL Final    Albumin 07/19/2018 3 7  3 5 - 5 0 g/dL Final    Total Bilirubin 07/19/2018 1 01* 0 20 - 1 00 mg/dL Final    eGFR 07/19/2018 83  ml/min/1 73sq m Final    Hemoglobin A1C 07/19/2018 5 8  4 2 - 6 3 % Final    EAG 07/19/2018 120  mg/dl Final    Cholesterol 07/19/2018 170  50 - 200 mg/dL Final      Cholesterol:       Desirable         <200 mg/dl       Borderline         200-239 mg/dl       High              >239           Triglycerides 07/19/2018 60  <=150 mg/dL Final      Triglyceride:     Normal          <150 mg/dl     Borderline High 150-199 mg/dl     High            200-499 mg/dl        Very High       >499 mg/dl    Specimen collection should occur prior to N-Acetylcysteine or Metamizole administration due to the potential for falsely depressed results   HDL, Direct 07/19/2018 50  40 - 60 mg/dL Final      HDL Cholesterol:       High    >60 mg/dL       Low     <41 mg/dL  Specimen collection should occur prior to Metamizole administration due to the potential for falsley depressed results   LDL Calculated 07/19/2018 108* 0 - 100 mg/dL Final      LDL Cholesterol:     Optimal           <100 mg/dl     Near Optimal      100-129 mg/dl     Above Optimal       Borderline High 130-159 mg/dl       High            160-189 mg/dl       Very High       >189 mg/dl         This screening LDL is a calculated result  It does not have the accuracy of the Direct Measured LDL in the monitoring of patients with hyperlipidemia and/or statin therapy  Direct Measure LDL ([id number]) must be ordered separately in these patients   Non-HDL-Chol (CHOL-HDL) 07/19/2018 120  mg/dl Final    Iron 07/19/2018 45* 50 - 170 ug/dL Final      Patients treated with metal-binding drugs (ie  Deferoxamine) may have depressed iron values      Vitamin B-12 07/19/2018 265  100 - 900 pg/mL Final    TIBC 07/19/2018 389  250 - 450 ug/dL Final    Vit D, 25-Hydroxy 07/19/2018 47 1  30 0 - 100 0 ng/mL Final       Deehi JOSSELYN Tomlinson MD    Note: Portions of the record may have been created with voice recognition software  Occasional wrong word or "sound a like" substitutions may have occurred due to the inherent limitations of voice recognition software  Read the chart carefully and recognize, using context, where substitutions have occurred

## 2018-11-13 NOTE — ASSESSMENT & PLAN NOTE
BP Readings from Last 3 Encounters:   11/13/18 144/88   09/20/18 140/84   06/24/18 (!) 173/82       Results from last 6 Months  Lab Units 09/06/18  1205 07/19/18  1009   POTASSIUM mmol/L 4 3 4 5   CHLORIDE mmol/L 101 106   CO2 mmol/L 29 28   BUN mg/dL 16 15   CREATININE mg/dL 0 76 0 74   CALCIUM mg/dL 9 3 9 2   ALK PHOS U/L 65 63   ALT U/L 25 27   AST U/L 21 18   BP is borderline elevated, but given lack of comorbidities and age, her goal is <150/90

## 2018-11-13 NOTE — ASSESSMENT & PLAN NOTE
I had a long discussion today with the patient and daughter about diagnosis of lower esophageal adenocarcinoma being consistent with her symptoms of abdominal pain and fatigue  She has breast cancer s/p chemo and radiation and a strong family history of cancer related deaths so is wary of intervention  I advised that the alternative treatments she is pursuing are not a cure for her cancer, she needs evaluation by a Hem/Onc to determine her further options  I also discussed Palliative care, but that is not an option for them currently as well  I understand her hesitation, but she is still interested in pursuing further testing, so the best way to facilitate that conversation would be to speak with Hem/Onc  I did place a referral today and if patient decides, I can order a PET scan prior to her appt as well

## 2018-12-04 ENCOUNTER — OFFICE VISIT (OUTPATIENT)
Dept: HEMATOLOGY ONCOLOGY | Facility: CLINIC | Age: 68
End: 2018-12-04
Payer: MEDICARE

## 2018-12-04 VITALS
OXYGEN SATURATION: 97 % | BODY MASS INDEX: 30.55 KG/M2 | HEIGHT: 62 IN | TEMPERATURE: 98.1 F | RESPIRATION RATE: 16 BRPM | HEART RATE: 70 BPM | WEIGHT: 166 LBS

## 2018-12-04 DIAGNOSIS — D50.0 IRON DEFICIENCY ANEMIA DUE TO CHRONIC BLOOD LOSS: ICD-10-CM

## 2018-12-04 DIAGNOSIS — C15.5 ADENOCARCINOMA OF LOWER ESOPHAGUS (HCC): Primary | ICD-10-CM

## 2018-12-04 PROCEDURE — 99204 OFFICE O/P NEW MOD 45 MIN: CPT | Performed by: INTERNAL MEDICINE

## 2018-12-04 NOTE — PROGRESS NOTES
HEMATOLOGY / ONCOLOGY CLINIC NOTE    Primary Care Provider: Damián Tomlinson MD  Referring Provider: Haylie Aguilar  MRN: [id number]  : 1950    Reason for Encounter:  Chief Complaint   Patient presents with   [address] [place] [address] Patient Consult          History of Hematology / Oncology Illness:     Sofia Hayward is a 77 y o  female who came in  to establish care with oncology    1, Biopsy-proven  esophageal /  Shelbi Wilder  - 2018  Initially presented with iron deficiency anemia, EGD showed mass  2,   Remote history of breast cancer  -  Status post surgery, adjuvant chemotherapy, adjuvant radiation therapy    Interval History:       :  Patient is a 49-year-old female,  With history of remote breast cancer, AAA, vitamin-D deficiency, hypertension,   With diagnosis of esophageal adenocarcinoma, came in to establish care with Medical Oncology  Patient has a history of anemia, status post EGD in the past, repeat EGD was performed on 2018 in [facility], which showed GE junction mass, biopsy showed poorly differentiated adenocarcinoma  Patient did not look for further treatment options, given she has no symptom, she was on holistic methods with diet, recently presented some weight loss, came in for Discussion for treatment options  She reported has been at baseline health status, no new symptoms  She is able to swallow regular diet, both liquids and solid diet no issues  Lost a few lb, however generally speaking stable  No bleeding  No lumps bumps  No chest pain cough hemoptysis  Reported no bone pain, no breast lesion, no axillary lymph nodes  reported significant family history of malignancies, at least 3 family members has cancers in the past   Many family members has been through terminal illness,  Patient has concerns about side effects of treatment  Former smoker  Lives alone with daughters nearby        Problem list:     Patient Active Problem List Diagnosis    Iron deficiency anemia due to chronic blood loss    Essential hypertension    Vitamin D deficiency    AAA (abdominal aortic aneurysm) without rupture (HCC)    Hyperglycemia    Hammertoe of second toe of right foot    Adenocarcinoma of lower esophagus (HCC)       Assessment / Plan:       Cancer :   Newly diagnosed esophageal adenocarcinoma    - Cancer status :  Not on any treatment yet  -     Based on the description from the EGD note, this cancer was identified for at least T2 N1 M0, stage III, locally advanced disease  There is also a small hepatic lesion noticed, however too small to biopsy  " The mass appeared to be measuring approximately 29 mm in maximal thickness  From the EUS examination I feel that there is breakthrough of the muscularis propria  In the region of the gastrohepatic area there are several lymph nodes  I see a total of 3 lymph nodes  These lymph nodes are round and hypoechoic  The largest measures approximately 12 2 mm x 13 1 mm cross-sectional diameter  The radial echoendoscope was traversed into the gastric lumen  In this area the left lobe liver was able to be visualized  In the area of the left lobe liver I see an irregular hypoechoic area that is concerning for metastatic spot  This measures approximately 5 8 x 5 5 mm in cross-sectional diameter  Given the small size of this sampling was not performed today  No large lymph nodes are seen in the peripancreatic region  The pancreas was mostly unremarkable in the body and tail  The head of the pancreas was within normal limits  The perigastric region was otherwise unremarkable  "       We had a long discussion with patient regarding the diagnosis, prognosis, treatment options and risk benefit for each option  Made patient clear that the staging imaging, PET-CT need to be done for definitive stage    If this is locally advanced disease, potentially this is curable usually with concurrent chemo radiation followed by possible surgery  If this is metastatic disease usually is not curable, will proceed with chemotherapy alone  Depending on the molecular study, we may add Herceptin on top of the chemo  -     After a thorough discussion, patient is not very enthusiastic in proceeding further study or treatment for cancer for now  She would like to consider the option and discussed with other family member and one of the friend who is a physician  Made patient aware regarding side effects of chemotherapy, including but not limited to fatigue cytopenia, increased risk for infection, potential kidney, liver injuries neuropathies et al    Made patient aware to call MD or go to ED for any fever,  Chills, bleeding, easy bruise, unhealed wound, chest pain, abdominal pain et al        2    Iron deficiency    -  Based on the iron profile, this consistent with iron deficiency   Without anemia, could be related with cancer with chronic bleeding  I offered IV iron, patient will consider  overall, patient will consider all the options, I will follow patient in 1 week for her final decision  60     minutes were spent face to face with patient with greater than 50% of the time spent in counseling or coordination of care including discussions of treatment instructions  All of the patient's questions were answered to their satisfactory during this discussion  Advised pt to call if there is any further questions  PHYSICIAL EXAMINATION:     Vital Signs:   [unfilled]  Body mass index is 30 36 kg/m²  Body surface area is 1 77 meters squared  GEN: Alert, awake oriented x3, in no acute distress  HEENT- No pallor, icterus, cyanosis, no oral mucosal lesions,   LAD - no palpable cervical, clavicle, axillary, inguinal LAD  Heart- normal S1 S2, regular rate and rhythm, No murmur, rubs     Lungs- decreased breathing sound bilateral    Abdomen- soft, Non tender, bowel sounds present  Extremities- No cyanosis, clubbing, edema  Neuro- No focal neurological deficit           PAST MEDICAL HISTORY:   has no past medical history on file  PAST SURGICAL HISTORY:   has a past surgical history that includes Appendectomy; Tonsillectomy; Mastectomy; Carpal tunnel release; Colonoscopy w/ biopsies and polypectomy; and Hernia repair  CURRENT MEDICATIONS:   Current Outpatient Prescriptions   Medication Sig Dispense Refill    ascorbic acid (VITAMIN C) 500 mg tablet Take 1,000 mg by mouth      Cholecalciferol (VITAMIN D-3) 1000 units CAPS Take 5,000 Units by mouth      FERROUS FUMARATE ER PO Take 1 tablet by mouth daily       No current facility-administered medications for this visit  [unfilled]    SOCIAL HISTORY:   reports that she has never smoked  She has never used smokeless tobacco  She reports that she does not drink alcohol or use drugs  FAMILY HISTORY:  family history includes Aortic aneurysm in her brother and father; Breast cancer in her family; Deep vein thrombosis in her family  ALLERGIES:  is allergic to penicillins  REVIEW OF SYSTEMS:  Please note that a 14-point review of systems was performed to include Constitutional, HEENT, Respiratory, CVS, GI, , Musculoskeletal, Integumentary, Neurologic, Rheumatologic, Endocrinologic, Psychiatric, Lymphatic, and Hematologic/Oncologic systems were reviewed and are negative unless otherwise stated in HPI  Positive and negative findings pertinent to this evaluation are incorporated into the history of present illness              LAB:  Lab Results   Component Value Date    WBC 7 72 11/01/2018    HGB 12 6 11/01/2018    HCT 40 2 11/01/2018    MCV 86 11/01/2018     11/01/2018     Lab Results   Component Value Date     11/06/2015    K 4 3 09/06/2018     09/06/2018    CO2 29 09/06/2018    ANIONGAP 7 11/06/2015    BUN 16 09/06/2018    CREATININE 0 76 09/06/2018    GLUCOSE 96 11/06/2015    GLUF 81 09/06/2018    CALCIUM 9 3 09/06/2018    AST 21 09/06/2018    ALT 25 09/06/2018    ALKPHOS 65 09/06/2018    PROT 6 9 11/06/2015    BILITOT 1 14 (H) 11/06/2015    EGFR 80 09/06/2018       IMAGING:  No orders to display     No results found

## 2018-12-17 ENCOUNTER — LAB (OUTPATIENT)
Dept: LAB | Facility: CLINIC | Age: 68
End: 2018-12-17
Payer: MEDICARE

## 2018-12-17 DIAGNOSIS — D50.0 IRON DEFICIENCY ANEMIA DUE TO CHRONIC BLOOD LOSS: ICD-10-CM

## 2018-12-17 LAB
FERRITIN SERPL-MCNC: 8 NG/ML (ref 8–388)
IRON SATN MFR SERPL: 9 %
IRON SERPL-MCNC: 35 UG/DL (ref 50–170)
TIBC SERPL-MCNC: 385 UG/DL (ref 250–450)

## 2018-12-17 PROCEDURE — 82728 ASSAY OF FERRITIN: CPT

## 2018-12-17 PROCEDURE — 83540 ASSAY OF IRON: CPT

## 2018-12-17 PROCEDURE — 36415 COLL VENOUS BLD VENIPUNCTURE: CPT

## 2018-12-17 PROCEDURE — 83550 IRON BINDING TEST: CPT

## 2018-12-18 ENCOUNTER — TELEPHONE (OUTPATIENT)
Dept: HEMATOLOGY ONCOLOGY | Facility: CLINIC | Age: 68
End: 2018-12-18

## 2018-12-18 NOTE — TELEPHONE ENCOUNTER
REINALDO- pt called and canceled her appt for tomorrow, 12/18/18, could not reschedule without consulting her daughter  Is aware that Dr Marisel Coffman won't have any availability until Jan 2nd